# Patient Record
Sex: MALE | Race: OTHER | HISPANIC OR LATINO | URBAN - METROPOLITAN AREA
[De-identification: names, ages, dates, MRNs, and addresses within clinical notes are randomized per-mention and may not be internally consistent; named-entity substitution may affect disease eponyms.]

---

## 2017-02-20 ENCOUNTER — EMERGENCY (EMERGENCY)
Facility: HOSPITAL | Age: 52
LOS: 1 days | Discharge: HOME | End: 2017-02-20
Attending: EMERGENCY MEDICINE
Payer: COMMERCIAL

## 2017-02-20 VITALS
HEIGHT: 71 IN | SYSTOLIC BLOOD PRESSURE: 124 MMHG | RESPIRATION RATE: 17 BRPM | DIASTOLIC BLOOD PRESSURE: 81 MMHG | WEIGHT: 179.9 LBS | HEART RATE: 82 BPM | TEMPERATURE: 98 F | OXYGEN SATURATION: 97 %

## 2017-02-20 DIAGNOSIS — I25.10 ATHEROSCLEROTIC HEART DISEASE OF NATIVE CORONARY ARTERY WITHOUT ANGINA PECTORIS: ICD-10-CM

## 2017-02-20 DIAGNOSIS — Z79.82 LONG TERM (CURRENT) USE OF ASPIRIN: ICD-10-CM

## 2017-02-20 DIAGNOSIS — L02.411 CUTANEOUS ABSCESS OF RIGHT AXILLA: ICD-10-CM

## 2017-02-20 DIAGNOSIS — E78.00 PURE HYPERCHOLESTEROLEMIA, UNSPECIFIED: ICD-10-CM

## 2017-02-20 DIAGNOSIS — M51.37 OTHER INTERVERTEBRAL DISC DEGENERATION, LUMBOSACRAL REGION: Chronic | ICD-10-CM

## 2017-02-20 DIAGNOSIS — Z98.890 OTHER SPECIFIED POSTPROCEDURAL STATES: ICD-10-CM

## 2017-02-20 PROCEDURE — 99282 EMERGENCY DEPT VISIT SF MDM: CPT | Mod: 25

## 2017-02-20 PROCEDURE — 10060 I&D ABSCESS SIMPLE/SINGLE: CPT

## 2017-02-20 PROCEDURE — 10060 I&D ABSCESS SIMPLE/SINGLE: CPT | Mod: RT

## 2017-02-20 PROCEDURE — 99283 EMERGENCY DEPT VISIT LOW MDM: CPT | Mod: 25

## 2017-02-20 RX ORDER — AZTREONAM 2 G
1 VIAL (EA) INJECTION
Qty: 14 | Refills: 0 | OUTPATIENT
Start: 2017-02-20 | End: 2017-02-27

## 2017-02-20 NOTE — ED PROVIDER NOTE - PHYSICAL EXAMINATION
right axilla: swelling, erythema, fluctuance 2x2cm consistent with abscess, with mild surrounding erythema.

## 2017-02-20 NOTE — ED PROVIDER NOTE - OBJECTIVE STATEMENT
50yo male no sig pmh p/w swelling under his right armpit for 3 days, its getting worse, it hurts if he touches it, nothing makes it better. Denies fevers, chills, or anything else bothering him.

## 2017-02-20 NOTE — ED PROVIDER NOTE - PMH
Anxiety    CAD (coronary artery disease)    Herniated Disc    Herniated lumbar intervertebral disc    Hypercholesteremia    Migraine  2X weekly

## 2018-08-16 ENCOUNTER — APPOINTMENT (OUTPATIENT)
Dept: SPINE | Facility: CLINIC | Age: 53
End: 2018-08-16
Payer: COMMERCIAL

## 2018-08-16 VITALS
DIASTOLIC BLOOD PRESSURE: 80 MMHG | WEIGHT: 180 LBS | SYSTOLIC BLOOD PRESSURE: 120 MMHG | HEIGHT: 71 IN | BODY MASS INDEX: 25.2 KG/M2

## 2018-08-16 DIAGNOSIS — Z78.9 OTHER SPECIFIED HEALTH STATUS: ICD-10-CM

## 2018-08-16 PROCEDURE — 99203 OFFICE O/P NEW LOW 30 MIN: CPT

## 2018-08-16 RX ORDER — ALPRAZOLAM 0.25 MG/1
0.25 TABLET ORAL
Refills: 0 | Status: ACTIVE | COMMUNITY

## 2018-08-16 RX ORDER — BACLOFEN 20 MG/1
20 TABLET ORAL
Refills: 0 | Status: ACTIVE | COMMUNITY

## 2018-08-16 RX ORDER — IBUPROFEN 800 MG/1
800 TABLET, FILM COATED ORAL
Refills: 0 | Status: ACTIVE | COMMUNITY

## 2018-08-16 RX ORDER — PANTOPRAZOLE 40 MG/1
40 TABLET, DELAYED RELEASE ORAL
Refills: 0 | Status: ACTIVE | COMMUNITY

## 2018-10-04 ENCOUNTER — APPOINTMENT (OUTPATIENT)
Dept: SPINE | Facility: CLINIC | Age: 53
End: 2018-10-04
Payer: COMMERCIAL

## 2018-10-04 ENCOUNTER — TRANSCRIPTION ENCOUNTER (OUTPATIENT)
Age: 53
End: 2018-10-04

## 2018-10-04 PROCEDURE — 99213 OFFICE O/P EST LOW 20 MIN: CPT

## 2019-05-22 ENCOUNTER — EMERGENCY (EMERGENCY)
Facility: HOSPITAL | Age: 54
LOS: 1 days | Discharge: HOME | End: 2019-05-22
Attending: EMERGENCY MEDICINE
Payer: MEDICARE

## 2019-05-22 VITALS
WEIGHT: 179.9 LBS | OXYGEN SATURATION: 98 % | TEMPERATURE: 98 F | HEART RATE: 76 BPM | SYSTOLIC BLOOD PRESSURE: 120 MMHG | RESPIRATION RATE: 18 BRPM | DIASTOLIC BLOOD PRESSURE: 75 MMHG

## 2019-05-22 DIAGNOSIS — M51.37 OTHER INTERVERTEBRAL DISC DEGENERATION, LUMBOSACRAL REGION: Chronic | ICD-10-CM

## 2019-05-22 LAB
ALBUMIN SERPL ELPH-MCNC: 4.2 G/DL — SIGNIFICANT CHANGE UP (ref 3.5–5)
ALP SERPL-CCNC: 59 U/L — SIGNIFICANT CHANGE UP (ref 40–120)
ALT FLD-CCNC: 30 U/L DA — SIGNIFICANT CHANGE UP (ref 10–60)
ANION GAP SERPL CALC-SCNC: 5 MMOL/L — SIGNIFICANT CHANGE UP (ref 5–17)
APTT BLD: 32.8 SEC — SIGNIFICANT CHANGE UP (ref 27.5–36.3)
AST SERPL-CCNC: 23 U/L — SIGNIFICANT CHANGE UP (ref 10–40)
BILIRUB SERPL-MCNC: 0.5 MG/DL — SIGNIFICANT CHANGE UP (ref 0.2–1.2)
BUN SERPL-MCNC: 20 MG/DL — HIGH (ref 7–18)
CALCIUM SERPL-MCNC: 9 MG/DL — SIGNIFICANT CHANGE UP (ref 8.4–10.5)
CHLORIDE SERPL-SCNC: 108 MMOL/L — SIGNIFICANT CHANGE UP (ref 96–108)
CO2 SERPL-SCNC: 28 MMOL/L — SIGNIFICANT CHANGE UP (ref 22–31)
CREAT SERPL-MCNC: 1.1 MG/DL — SIGNIFICANT CHANGE UP (ref 0.5–1.3)
GLUCOSE SERPL-MCNC: 93 MG/DL — SIGNIFICANT CHANGE UP (ref 70–99)
HCT VFR BLD CALC: 43.1 % — SIGNIFICANT CHANGE UP (ref 39–50)
HGB BLD-MCNC: 14.2 G/DL — SIGNIFICANT CHANGE UP (ref 13–17)
INR BLD: 0.96 RATIO — SIGNIFICANT CHANGE UP (ref 0.88–1.16)
MCHC RBC-ENTMCNC: 32.6 PG — SIGNIFICANT CHANGE UP (ref 27–34)
MCHC RBC-ENTMCNC: 32.9 GM/DL — SIGNIFICANT CHANGE UP (ref 32–36)
MCV RBC AUTO: 99.1 FL — SIGNIFICANT CHANGE UP (ref 80–100)
NRBC # BLD: 0 /100 WBCS — SIGNIFICANT CHANGE UP (ref 0–0)
PLATELET # BLD AUTO: 209 K/UL — SIGNIFICANT CHANGE UP (ref 150–400)
POTASSIUM SERPL-MCNC: 4.4 MMOL/L — SIGNIFICANT CHANGE UP (ref 3.5–5.3)
POTASSIUM SERPL-SCNC: 4.4 MMOL/L — SIGNIFICANT CHANGE UP (ref 3.5–5.3)
PROT SERPL-MCNC: 8.2 G/DL — SIGNIFICANT CHANGE UP (ref 6–8.3)
PROTHROM AB SERPL-ACNC: 10.6 SEC — SIGNIFICANT CHANGE UP (ref 10–12.9)
RBC # BLD: 4.35 M/UL — SIGNIFICANT CHANGE UP (ref 4.2–5.8)
RBC # FLD: 12.4 % — SIGNIFICANT CHANGE UP (ref 10.3–14.5)
SODIUM SERPL-SCNC: 141 MMOL/L — SIGNIFICANT CHANGE UP (ref 135–145)
TROPONIN I SERPL-MCNC: <0.015 NG/ML — SIGNIFICANT CHANGE UP (ref 0–0.04)
WBC # BLD: 5.22 K/UL — SIGNIFICANT CHANGE UP (ref 3.8–10.5)
WBC # FLD AUTO: 5.22 K/UL — SIGNIFICANT CHANGE UP (ref 3.8–10.5)

## 2019-05-22 PROCEDURE — 71046 X-RAY EXAM CHEST 2 VIEWS: CPT

## 2019-05-22 PROCEDURE — 80053 COMPREHEN METABOLIC PANEL: CPT

## 2019-05-22 PROCEDURE — 36415 COLL VENOUS BLD VENIPUNCTURE: CPT

## 2019-05-22 PROCEDURE — 84484 ASSAY OF TROPONIN QUANT: CPT

## 2019-05-22 PROCEDURE — 85730 THROMBOPLASTIN TIME PARTIAL: CPT

## 2019-05-22 PROCEDURE — 93005 ELECTROCARDIOGRAM TRACING: CPT

## 2019-05-22 PROCEDURE — 85610 PROTHROMBIN TIME: CPT

## 2019-05-22 PROCEDURE — 99285 EMERGENCY DEPT VISIT HI MDM: CPT

## 2019-05-22 PROCEDURE — 85027 COMPLETE CBC AUTOMATED: CPT

## 2019-05-22 PROCEDURE — 71046 X-RAY EXAM CHEST 2 VIEWS: CPT | Mod: 26

## 2019-05-22 PROCEDURE — 99283 EMERGENCY DEPT VISIT LOW MDM: CPT | Mod: 25

## 2019-05-22 RX ORDER — SODIUM CHLORIDE 9 MG/ML
1000 INJECTION INTRAMUSCULAR; INTRAVENOUS; SUBCUTANEOUS ONCE
Refills: 0 | Status: COMPLETED | OUTPATIENT
Start: 2019-05-22 | End: 2019-05-22

## 2019-05-22 RX ORDER — DIAZEPAM 5 MG
5 TABLET ORAL ONCE
Refills: 0 | Status: DISCONTINUED | OUTPATIENT
Start: 2019-05-22 | End: 2019-05-22

## 2019-05-22 RX ADMIN — SODIUM CHLORIDE 2000 MILLILITER(S): 9 INJECTION INTRAMUSCULAR; INTRAVENOUS; SUBCUTANEOUS at 16:31

## 2019-05-22 RX ADMIN — Medication 5 MILLIGRAM(S): at 16:50

## 2019-05-22 NOTE — ED PROVIDER NOTE - OBJECTIVE STATEMENT
52 yo hx of prior back surgery, anxiety w complaints of weakness, palpitations, shortness of breath for three days, no worsening of sx on exertion, feels anxious regarding sx. On Paroxetine, Xanax. 52 yo hx of prior back surgery, anxiety w complaints of weakness, palpitations, shortness of breath for three days, no worsening of sx on exertion, feels anxious regarding sx. On Paroxetine, Xanax.  Has chronic back pain and receive unspecified "shots in back" for back pain. No assoc f/c, IVDU, swelling. Pt has not tried anything for symptoms, no other aggravating or relieving factors.

## 2019-05-22 NOTE — ED PROVIDER NOTE - CLINICAL SUMMARY MEDICAL DECISION MAKING FREE TEXT BOX
Low risk wells, will eval for ACS, other lyte abnormality, appears some component of anxiety. Last cardiac evaluation four years ago. Low risk wells, will eval for ACS, other lyte abnormality, appears some component of anxiety. Last cardiac evaluation reviewed on prior admission. Low risk wells, will eval for ACS, other lyte abnormality. Last cardiac evaluation reviewed on prior admission.

## 2019-05-22 NOTE — ED PROVIDER NOTE - PROGRESS NOTE DETAILS
Resting comfortably, no cp, Discussed with pt results of work up, strict return precautions, and need for follow up.  Pt expressed understanding and agrees with plan. advised to fu cards this week. return for any worsening sx.

## 2019-05-22 NOTE — ED PROVIDER NOTE - NSFOLLOWUPINSTRUCTIONS_ED_ALL_ED_FT
Disnea en los adultos  Shortness of Breath, Adult  Introducción  Image   La disnea significa que tiene dificultad para respirar. Los pulmones son órganos involucrados en la respiración.    Siga estas indicaciones en meade casa:  Esté atento a cualquier cambio en los síntomas. [place] estas medidas para controlar meade afección:  No fume. Fumar puede causar disnea. Si necesita ayuda para dejar de fumar, consulte con el médico.  Evite todo aquello que puede dificultar la respiración; por ejemplo:  Moho.  Polvo.  Contaminación del aire.  Olores de productos químicos.  Cosas que causan síntomas de alergia (alérgenos), si tiene alergias.  Mantenga meade casa limpia, y sin moho ni polvo.  Descanse todo lo que sea necesario. Retome gradualmente yovany actividades habituales.  [place] los medicamentos de venta marina y los recetados, que incluyen el oxígeno y los medicamentos que se inhalan, solamente joe se lo haya indicado el médico.  Concurra a todas las visitas de control joe se lo haya indicado el médico. [place] es importante.  Comuníquese con un médico si:  Meade afección no se vandana tan pronto joe se espera.  Le renaldo hacer las actividades cotidianas, incluso después de descansar.  Aparecen nuevos síntomas.  Solicite ayuda de inmediato si:  Tiene dificultad para respirar cuando descansa.  Se siente mareado o se desmaya.  Tiene tos que no se vandana con medicamentos.  Tose y escupe sarah.  Siente dolor al respirar.  Tiene dolor en el pecho, los brazos, los hombros o el vientre (abdomen).  Tiene fiebre.  No puede subir escaleras.  No puede realizar ejercicio del modo en que lo hacía habitualmente.  Esta información no tiene joe fin reemplazar el consejo del médico. Asegúrese de hacerle al médico cualquier pregunta que tenga.

## 2019-06-20 ENCOUNTER — TRANSCRIPTION ENCOUNTER (OUTPATIENT)
Age: 54
End: 2019-06-20

## 2019-06-26 ENCOUNTER — APPOINTMENT (OUTPATIENT)
Dept: ORTHOPEDIC SURGERY | Facility: CLINIC | Age: 54
End: 2019-06-26
Payer: MEDICARE

## 2019-06-26 VITALS — BODY MASS INDEX: 25.2 KG/M2 | HEIGHT: 71 IN | WEIGHT: 180 LBS

## 2019-06-26 DIAGNOSIS — M25.562 PAIN IN RIGHT KNEE: ICD-10-CM

## 2019-06-26 DIAGNOSIS — M25.561 PAIN IN RIGHT KNEE: ICD-10-CM

## 2019-06-26 PROCEDURE — 73564 X-RAY EXAM KNEE 4 OR MORE: CPT | Mod: 50

## 2019-06-26 PROCEDURE — 99204 OFFICE O/P NEW MOD 45 MIN: CPT

## 2019-06-26 NOTE — PHYSICAL EXAM
[de-identified] : Long standing knee, AP knee, lateral knee, and patellar views of the bilateral knee were ordered and taken in the office and demonstrate no evidence of degenerative joint disease of the knee, fractures, intra-articular pathology. [de-identified] : Patient is well nourished, well-developed, in no acute distress, with appropriate mood and affect. The patient is oriented to time, place, and person. Respirations are even and unlabored. Gait evaluation does not reveal a limp. There is no inguinal adenopathy. The right limb is well-perfused and showed 2+ dp/pt pulses, without skin lesions, shows a grossly normal motor and sensory examination. Right knee motion is painless and the knee moves from 0-125 degrees. The knee is stable within that range-of-motion to AP and ML stress with a 1A Lachman, negative anterior or posterior drawer and no instability to varus or valgus stress. The alignment of the knee is 5 degrees varus. No effusion or crepitus is noted. No tenderness to palpation about the medial or lateral joint line, medial or lateral tibial plateau, medial or lateral femoral condyle, medial or lateral patellar facets, superior or inferior pole of the patella. Chandu's is negative. Muscle strength is normal. Pedal pulses are palpable. Hip examination was negative. The left limb is well-perfused and showed 2+ dp/pt pulses, without skin lesions, shows a grossly normal motor and sensory examination. Left knee motion is painless and the knee moves from 0-125 degrees. The knee is stable within that range-of-motion to AP and ML stress with a 1A Lachman, negative anterior or posterior drawer and no instability to varus or valgus stress. The alignment of the knee is 5 degrees varus. No effusion or crepitus is noted. No tenderness to palpation about the medial or lateral joint line, medial or lateral tibial plateau, medial or lateral femoral condyle, medial or lateral patellar facets, superior or inferior pole of the patella. Chandu's is negative. Muscle strength is normal. Pedal pulses are palpable. Hip examination was negative.

## 2019-06-26 NOTE — DISCUSSION/SUMMARY
[de-identified] : This patient has bilateral lower extremity pain secondary to lumbar radiculopathy.  His knees exams are benign and radiographs are normal.  Likely the pain is coming from his lumbar spine due to lumbar radiculopathy.  Referral made to physiatry.  Continue with the Mobic.  Follow-up PRN.

## 2019-06-26 NOTE — HISTORY OF PRESENT ILLNESS
[de-identified] : This is very nice 53-year-old gentleman experiencing 2 weeks of bilateral lower extremity pain, which is severe in intensity.  He has a history of lumbar radiculopathy secondary to spinal stenosis.  He has already had spine surgery in the past.  He complains of 2 weeks of medial thigh pain and leg pain that radiates to the foot that is associated with numbness and tingling but no weakness.  No bowel or bladder incontinence.  The pain substantially limits activities of daily living. Walking tolerance is reduced.  Mobic does help to improve the pain.  He has not had physical therapy.  He does not use a cane or walker.

## 2019-10-02 ENCOUNTER — EMERGENCY (EMERGENCY)
Facility: HOSPITAL | Age: 54
LOS: 1 days | Discharge: HOME | End: 2019-10-02
Attending: EMERGENCY MEDICINE
Payer: MEDICARE

## 2019-10-02 VITALS
HEIGHT: 71 IN | SYSTOLIC BLOOD PRESSURE: 120 MMHG | RESPIRATION RATE: 16 BRPM | OXYGEN SATURATION: 98 % | WEIGHT: 179.9 LBS | DIASTOLIC BLOOD PRESSURE: 84 MMHG | HEART RATE: 74 BPM | TEMPERATURE: 98 F

## 2019-10-02 DIAGNOSIS — M51.37 OTHER INTERVERTEBRAL DISC DEGENERATION, LUMBOSACRAL REGION: Chronic | ICD-10-CM

## 2019-10-02 LAB
ALBUMIN SERPL ELPH-MCNC: 4 G/DL — SIGNIFICANT CHANGE UP (ref 3.5–5)
ALP SERPL-CCNC: 65 U/L — SIGNIFICANT CHANGE UP (ref 40–120)
ALT FLD-CCNC: 31 U/L DA — SIGNIFICANT CHANGE UP (ref 10–60)
ANION GAP SERPL CALC-SCNC: 7 MMOL/L — SIGNIFICANT CHANGE UP (ref 5–17)
AST SERPL-CCNC: 21 U/L — SIGNIFICANT CHANGE UP (ref 10–40)
BASOPHILS # BLD AUTO: 0.01 K/UL — SIGNIFICANT CHANGE UP (ref 0–0.2)
BASOPHILS NFR BLD AUTO: 0.2 % — SIGNIFICANT CHANGE UP (ref 0–2)
BILIRUB SERPL-MCNC: 0.6 MG/DL — SIGNIFICANT CHANGE UP (ref 0.2–1.2)
BUN SERPL-MCNC: 14 MG/DL — SIGNIFICANT CHANGE UP (ref 7–18)
CALCIUM SERPL-MCNC: 9.2 MG/DL — SIGNIFICANT CHANGE UP (ref 8.4–10.5)
CHLORIDE SERPL-SCNC: 109 MMOL/L — HIGH (ref 96–108)
CO2 SERPL-SCNC: 25 MMOL/L — SIGNIFICANT CHANGE UP (ref 22–31)
CREAT SERPL-MCNC: 1.14 MG/DL — SIGNIFICANT CHANGE UP (ref 0.5–1.3)
EOSINOPHIL # BLD AUTO: 0.04 K/UL — SIGNIFICANT CHANGE UP (ref 0–0.5)
EOSINOPHIL NFR BLD AUTO: 0.9 % — SIGNIFICANT CHANGE UP (ref 0–6)
GLUCOSE SERPL-MCNC: 94 MG/DL — SIGNIFICANT CHANGE UP (ref 70–99)
HCT VFR BLD CALC: 42.1 % — SIGNIFICANT CHANGE UP (ref 39–50)
HGB BLD-MCNC: 14.3 G/DL — SIGNIFICANT CHANGE UP (ref 13–17)
IMM GRANULOCYTES NFR BLD AUTO: 0 % — SIGNIFICANT CHANGE UP (ref 0–1.5)
LIDOCAIN IGE QN: 89 U/L — SIGNIFICANT CHANGE UP (ref 73–393)
LYMPHOCYTES # BLD AUTO: 1.3 K/UL — SIGNIFICANT CHANGE UP (ref 1–3.3)
LYMPHOCYTES # BLD AUTO: 28.4 % — SIGNIFICANT CHANGE UP (ref 13–44)
MCHC RBC-ENTMCNC: 32.3 PG — SIGNIFICANT CHANGE UP (ref 27–34)
MCHC RBC-ENTMCNC: 34 GM/DL — SIGNIFICANT CHANGE UP (ref 32–36)
MCV RBC AUTO: 95 FL — SIGNIFICANT CHANGE UP (ref 80–100)
MONOCYTES # BLD AUTO: 0.34 K/UL — SIGNIFICANT CHANGE UP (ref 0–0.9)
MONOCYTES NFR BLD AUTO: 7.4 % — SIGNIFICANT CHANGE UP (ref 2–14)
NEUTROPHILS # BLD AUTO: 2.88 K/UL — SIGNIFICANT CHANGE UP (ref 1.8–7.4)
NEUTROPHILS NFR BLD AUTO: 63.1 % — SIGNIFICANT CHANGE UP (ref 43–77)
NRBC # BLD: 0 /100 WBCS — SIGNIFICANT CHANGE UP (ref 0–0)
OB PNL STL: NEGATIVE — SIGNIFICANT CHANGE UP
PLATELET # BLD AUTO: 209 K/UL — SIGNIFICANT CHANGE UP (ref 150–400)
POTASSIUM SERPL-MCNC: 4 MMOL/L — SIGNIFICANT CHANGE UP (ref 3.5–5.3)
POTASSIUM SERPL-SCNC: 4 MMOL/L — SIGNIFICANT CHANGE UP (ref 3.5–5.3)
PROT SERPL-MCNC: 8.1 G/DL — SIGNIFICANT CHANGE UP (ref 6–8.3)
RBC # BLD: 4.43 M/UL — SIGNIFICANT CHANGE UP (ref 4.2–5.8)
RBC # FLD: 11.9 % — SIGNIFICANT CHANGE UP (ref 10.3–14.5)
SODIUM SERPL-SCNC: 141 MMOL/L — SIGNIFICANT CHANGE UP (ref 135–145)
WBC # BLD: 4.57 K/UL — SIGNIFICANT CHANGE UP (ref 3.8–10.5)
WBC # FLD AUTO: 4.57 K/UL — SIGNIFICANT CHANGE UP (ref 3.8–10.5)

## 2019-10-02 PROCEDURE — 96374 THER/PROPH/DIAG INJ IV PUSH: CPT | Mod: XU

## 2019-10-02 PROCEDURE — 36415 COLL VENOUS BLD VENIPUNCTURE: CPT

## 2019-10-02 PROCEDURE — 99284 EMERGENCY DEPT VISIT MOD MDM: CPT | Mod: 25

## 2019-10-02 PROCEDURE — 74177 CT ABD & PELVIS W/CONTRAST: CPT | Mod: 26

## 2019-10-02 PROCEDURE — 83690 ASSAY OF LIPASE: CPT

## 2019-10-02 PROCEDURE — 82272 OCCULT BLD FECES 1-3 TESTS: CPT

## 2019-10-02 PROCEDURE — 80053 COMPREHEN METABOLIC PANEL: CPT

## 2019-10-02 PROCEDURE — 85027 COMPLETE CBC AUTOMATED: CPT

## 2019-10-02 PROCEDURE — 99284 EMERGENCY DEPT VISIT MOD MDM: CPT

## 2019-10-02 PROCEDURE — 74177 CT ABD & PELVIS W/CONTRAST: CPT

## 2019-10-02 RX ORDER — PANTOPRAZOLE SODIUM 20 MG/1
40 TABLET, DELAYED RELEASE ORAL ONCE
Refills: 0 | Status: COMPLETED | OUTPATIENT
Start: 2019-10-02 | End: 2019-10-02

## 2019-10-02 RX ORDER — ONDANSETRON 8 MG/1
1 TABLET, FILM COATED ORAL
Qty: 21 | Refills: 0
Start: 2019-10-02

## 2019-10-02 RX ADMIN — PANTOPRAZOLE SODIUM 40 MILLIGRAM(S): 20 TABLET, DELAYED RELEASE ORAL at 15:15

## 2019-10-02 NOTE — ED PROVIDER NOTE - PATIENT PORTAL LINK FT
You can access the FollowMyHealth Patient Portal offered by [facility] by registering at the following website: [email or web address]. By joining [employer]’s FollowMyHealth portal, you will also be able to view your health information using other applications (apps) compatible with our system.

## 2019-10-02 NOTE — ED ADULT NURSE NOTE - NSFALLRSKASSESSTYPE_ED_ALL_ED
Initial (On Arrival) Affect and characteristics of appearance, verbalizations, behaviors are appropriate

## 2019-10-02 NOTE — ED ADULT NURSE NOTE - OBJECTIVE STATEMENT
Pt. c/o generalized abdominal pain that started 3 days ago with this morning having "a little diarrhea". Pt. c/o black stool.

## 2019-10-02 NOTE — ED PROVIDER NOTE - OBJECTIVE STATEMENT
54 y/o M with PMHx of CAD, HLD presents to the ED with complaints of abdominal pain and black stool x 3 days. Patient notes pain originally in the LLQ and is now diffuse. Patient has no prior history of GI bleed. Denies vomiting, diarrhea, constipation or any other acute complaints. Patient denies excessive NSAID use.

## 2020-06-05 ENCOUNTER — APPOINTMENT (OUTPATIENT)
Dept: UROLOGY | Facility: CLINIC | Age: 55
End: 2020-06-05
Payer: MEDICARE

## 2020-06-05 VITALS
HEIGHT: 71 IN | WEIGHT: 180 LBS | HEART RATE: 88 BPM | SYSTOLIC BLOOD PRESSURE: 122 MMHG | DIASTOLIC BLOOD PRESSURE: 82 MMHG | TEMPERATURE: 97.3 F | BODY MASS INDEX: 25.2 KG/M2

## 2020-06-05 PROCEDURE — 99204 OFFICE O/P NEW MOD 45 MIN: CPT

## 2020-06-05 RX ORDER — SILDENAFIL 100 MG/1
100 TABLET, FILM COATED ORAL
Qty: 10 | Refills: 1 | Status: ACTIVE | COMMUNITY
Start: 2020-06-05 | End: 1900-01-01

## 2020-06-05 NOTE — HISTORY OF PRESENT ILLNESS
[FreeTextEntry1] : cc ed \par 55 yo male long h/o ed low test\par pt of dr mccord was on test injection but not since last year due to insurance change\par poor erections some improvement with sildenafil . decreaed libido reports h/o infertility \par voidng well\par \par psa 0.7

## 2020-06-05 NOTE — ASSESSMENT
[FreeTextEntry1] : Will try sildenafil side effects reviewed\par More common reviewed- redness or warmth in your face, neck, or chest; cold symptoms such as stuffy nose, sneezing, or sore throat; headache; memory problems; diarrhea, upset stomach; or muscle pain, back pain.\par Stop immediately and got to ER for changes in vision or sudden vision loss; ringing in your ears, or sudden hearing loss; chest pain or heavy feeling, pain spreading to the arm or shoulder, nausea, sweating, general ill feeling; irregular heartbeat; shortness of breath, swelling in your hands or feet; seizure (convulsions); feeling light-headed, fainting; or penis erection that is painful or lasts 4 hours or longer\par \par check test level \par

## 2020-06-10 ENCOUNTER — TRANSCRIPTION ENCOUNTER (OUTPATIENT)
Age: 55
End: 2020-06-10

## 2020-06-10 LAB
LH SERPL-ACNC: 7.7 IU/L
PROLACTIN SERPL-MCNC: 9 NG/ML

## 2020-07-23 LAB
SHBG SERPL-SCNC: 35 NMOL/L
TESTOST BND SERPL-MCNC: 7.1 PG/ML
TESTOST SERPL-MCNC: 332.3 NG/DL

## 2020-09-02 ENCOUNTER — APPOINTMENT (OUTPATIENT)
Dept: UROLOGY | Facility: CLINIC | Age: 55
End: 2020-09-02
Payer: MEDICARE

## 2020-09-02 VITALS
HEART RATE: 89 BPM | SYSTOLIC BLOOD PRESSURE: 120 MMHG | HEIGHT: 71 IN | DIASTOLIC BLOOD PRESSURE: 82 MMHG | TEMPERATURE: 98.1 F | WEIGHT: 180 LBS | BODY MASS INDEX: 25.2 KG/M2

## 2020-09-02 DIAGNOSIS — N52.9 MALE ERECTILE DYSFUNCTION, UNSPECIFIED: ICD-10-CM

## 2020-09-02 PROCEDURE — 99213 OFFICE O/P EST LOW 20 MIN: CPT

## 2020-09-03 PROBLEM — N52.9 ERECTILE DYSFUNCTION: Status: ACTIVE | Noted: 2020-06-05

## 2020-09-03 RX ORDER — TESTOSTERONE CYPIONATE 200 MG/ML
200 INJECTION, SOLUTION INTRAMUSCULAR
Qty: 2 | Refills: 0 | Status: ACTIVE | COMMUNITY
Start: 2020-09-02 | End: 1900-01-01

## 2020-09-03 NOTE — ASSESSMENT
[FreeTextEntry1] : Pt would like to try testosterone replacement therapy\par Reviewed risk it may\par Contribute to sleep apnea — a potentially serious sleep disorder in which breathing repeatedly stops and starts\par Cause acne or other skin reactions\par Stimulate noncancerous growth of the prostate (benign prostatic hyperplasia) and growth of existing prostate cancer\par Enlarge breasts Limit sperm production or cause testicle shrinkage\par Increase the risk of a blood clot forming in a deep vein (deep vein thrombosis), which could break loose, travel through your bloodstream and lodge in your lungs, blocking blood flow (pulmonary embolism)\par Reviewed unknown risk for prostate cancer and cardiovascular disease\par will f/u for test injection - unwilling to inject imself\par

## 2020-09-08 LAB
TESTOST BND SERPL-MCNC: 8.5 PG/ML
TESTOST SERPL-MCNC: 358.2 NG/DL

## 2021-02-26 ENCOUNTER — EMERGENCY (EMERGENCY)
Facility: HOSPITAL | Age: 56
LOS: 1 days | Discharge: HOME | End: 2021-02-26
Attending: EMERGENCY MEDICINE | Admitting: EMERGENCY MEDICINE
Payer: MEDICARE

## 2021-02-26 VITALS
HEIGHT: 71 IN | HEART RATE: 77 BPM | TEMPERATURE: 97 F | OXYGEN SATURATION: 99 % | DIASTOLIC BLOOD PRESSURE: 63 MMHG | RESPIRATION RATE: 16 BRPM | SYSTOLIC BLOOD PRESSURE: 119 MMHG

## 2021-02-26 VITALS
TEMPERATURE: 98 F | HEART RATE: 64 BPM | RESPIRATION RATE: 18 BRPM | OXYGEN SATURATION: 100 % | SYSTOLIC BLOOD PRESSURE: 121 MMHG | DIASTOLIC BLOOD PRESSURE: 78 MMHG

## 2021-02-26 DIAGNOSIS — M51.37 OTHER INTERVERTEBRAL DISC DEGENERATION, LUMBOSACRAL REGION: Chronic | ICD-10-CM

## 2021-02-26 LAB
ALBUMIN SERPL ELPH-MCNC: 4.7 G/DL — SIGNIFICANT CHANGE UP (ref 3.3–5)
ALP SERPL-CCNC: 62 U/L — SIGNIFICANT CHANGE UP (ref 40–120)
ALT FLD-CCNC: 18 U/L — SIGNIFICANT CHANGE UP (ref 4–41)
ANION GAP SERPL CALC-SCNC: 12 MMOL/L — SIGNIFICANT CHANGE UP (ref 7–14)
APPEARANCE UR: CLEAR — SIGNIFICANT CHANGE UP
AST SERPL-CCNC: 21 U/L — SIGNIFICANT CHANGE UP (ref 4–40)
BASOPHILS # BLD AUTO: 0.02 K/UL — SIGNIFICANT CHANGE UP (ref 0–0.2)
BASOPHILS NFR BLD AUTO: 0.2 % — SIGNIFICANT CHANGE UP (ref 0–2)
BILIRUB SERPL-MCNC: 0.4 MG/DL — SIGNIFICANT CHANGE UP (ref 0.2–1.2)
BILIRUB UR-MCNC: NEGATIVE — SIGNIFICANT CHANGE UP
BUN SERPL-MCNC: 17 MG/DL — SIGNIFICANT CHANGE UP (ref 7–23)
CALCIUM SERPL-MCNC: 9.4 MG/DL — SIGNIFICANT CHANGE UP (ref 8.4–10.5)
CHLORIDE SERPL-SCNC: 102 MMOL/L — SIGNIFICANT CHANGE UP (ref 98–107)
CO2 SERPL-SCNC: 25 MMOL/L — SIGNIFICANT CHANGE UP (ref 22–31)
COLOR SPEC: YELLOW — SIGNIFICANT CHANGE UP
CREAT SERPL-MCNC: 0.98 MG/DL — SIGNIFICANT CHANGE UP (ref 0.5–1.3)
DIFF PNL FLD: NEGATIVE — SIGNIFICANT CHANGE UP
EOSINOPHIL # BLD AUTO: 0 K/UL — SIGNIFICANT CHANGE UP (ref 0–0.5)
EOSINOPHIL NFR BLD AUTO: 0 % — SIGNIFICANT CHANGE UP (ref 0–6)
GLUCOSE SERPL-MCNC: 98 MG/DL — SIGNIFICANT CHANGE UP (ref 70–99)
GLUCOSE UR QL: NEGATIVE — SIGNIFICANT CHANGE UP
HCT VFR BLD CALC: 44.4 % — SIGNIFICANT CHANGE UP (ref 39–50)
HGB BLD-MCNC: 14.5 G/DL — SIGNIFICANT CHANGE UP (ref 13–17)
IANC: 8.88 K/UL — HIGH (ref 1.5–8.5)
IMM GRANULOCYTES NFR BLD AUTO: 0.4 % — SIGNIFICANT CHANGE UP (ref 0–1.5)
KETONES UR-MCNC: ABNORMAL
LEUKOCYTE ESTERASE UR-ACNC: NEGATIVE — SIGNIFICANT CHANGE UP
LYMPHOCYTES # BLD AUTO: 1.64 K/UL — SIGNIFICANT CHANGE UP (ref 1–3.3)
LYMPHOCYTES # BLD AUTO: 14.7 % — SIGNIFICANT CHANGE UP (ref 13–44)
MCHC RBC-ENTMCNC: 31.5 PG — SIGNIFICANT CHANGE UP (ref 27–34)
MCHC RBC-ENTMCNC: 32.7 GM/DL — SIGNIFICANT CHANGE UP (ref 32–36)
MCV RBC AUTO: 96.5 FL — SIGNIFICANT CHANGE UP (ref 80–100)
MONOCYTES # BLD AUTO: 0.59 K/UL — SIGNIFICANT CHANGE UP (ref 0–0.9)
MONOCYTES NFR BLD AUTO: 5.3 % — SIGNIFICANT CHANGE UP (ref 2–14)
NEUTROPHILS # BLD AUTO: 8.88 K/UL — HIGH (ref 1.8–7.4)
NEUTROPHILS NFR BLD AUTO: 79.4 % — HIGH (ref 43–77)
NITRITE UR-MCNC: NEGATIVE — SIGNIFICANT CHANGE UP
NRBC # BLD: 0 /100 WBCS — SIGNIFICANT CHANGE UP
NRBC # FLD: 0 K/UL — SIGNIFICANT CHANGE UP
PH UR: 7.5 — SIGNIFICANT CHANGE UP (ref 5–8)
PLATELET # BLD AUTO: 262 K/UL — SIGNIFICANT CHANGE UP (ref 150–400)
POTASSIUM SERPL-MCNC: 3.8 MMOL/L — SIGNIFICANT CHANGE UP (ref 3.5–5.3)
POTASSIUM SERPL-SCNC: 3.8 MMOL/L — SIGNIFICANT CHANGE UP (ref 3.5–5.3)
PROT SERPL-MCNC: 7.5 G/DL — SIGNIFICANT CHANGE UP (ref 6–8.3)
PROT UR-MCNC: ABNORMAL
RBC # BLD: 4.6 M/UL — SIGNIFICANT CHANGE UP (ref 4.2–5.8)
RBC # FLD: 12 % — SIGNIFICANT CHANGE UP (ref 10.3–14.5)
SARS-COV-2 RNA SPEC QL NAA+PROBE: SIGNIFICANT CHANGE UP
SODIUM SERPL-SCNC: 139 MMOL/L — SIGNIFICANT CHANGE UP (ref 135–145)
SP GR SPEC: 1.03 — HIGH (ref 1.01–1.02)
TROPONIN T, HIGH SENSITIVITY RESULT: 12 NG/L — SIGNIFICANT CHANGE UP
TROPONIN T, HIGH SENSITIVITY RESULT: 15 NG/L — SIGNIFICANT CHANGE UP
TSH SERPL-MCNC: 0.76 UIU/ML — SIGNIFICANT CHANGE UP (ref 0.27–4.2)
UROBILINOGEN FLD QL: ABNORMAL
WBC # BLD: 11.17 K/UL — HIGH (ref 3.8–10.5)
WBC # FLD AUTO: 11.17 K/UL — HIGH (ref 3.8–10.5)

## 2021-02-26 PROCEDURE — 93010 ELECTROCARDIOGRAM REPORT: CPT

## 2021-02-26 PROCEDURE — 71046 X-RAY EXAM CHEST 2 VIEWS: CPT | Mod: 26

## 2021-02-26 PROCEDURE — 99285 EMERGENCY DEPT VISIT HI MDM: CPT | Mod: 25

## 2021-02-26 RX ORDER — SODIUM CHLORIDE 9 MG/ML
1000 INJECTION INTRAMUSCULAR; INTRAVENOUS; SUBCUTANEOUS ONCE
Refills: 0 | Status: COMPLETED | OUTPATIENT
Start: 2021-02-26 | End: 2021-02-26

## 2021-02-26 RX ADMIN — SODIUM CHLORIDE 1000 MILLILITER(S): 9 INJECTION INTRAMUSCULAR; INTRAVENOUS; SUBCUTANEOUS at 18:15

## 2021-02-26 NOTE — ED ADULT NURSE NOTE - CHIEF COMPLAINT QUOTE
C/o palpitations, generalized weakness, nausea and dizziness x 1 week. Pt states these symptoms "feel a little bit like anxiety attack". Pt took ASA@1330 today, states does not have allergy to ASA.  Hx CAD, anxiety. FS=98

## 2021-02-26 NOTE — ED PROVIDER NOTE - OBJECTIVE STATEMENT
56 y/o M, PMH of Migraines, GERD, and Panic Attacks, presents to ED c/o palpitations, dizziness/lightheadedness, and generalized weakness x 1 week. Pt reports that his symptoms acutely worsened over the last couple days, and he felt like he was going to pass out. Pt denies actual LOC. Pt denies CP, SOB, HA, abd pain, n/v/d, urinary sx, weakness/numbness, blood in stool, or any other symptoms at this time.

## 2021-02-26 NOTE — ED PROVIDER NOTE - EKG ADDITIONAL INFORMATION FREE TEXT
ecg, sr rate 67 no ischemic changes, no st twave elevations or changes, no pr shorting, no qt prolongation, nothing consisted with brugada

## 2021-02-26 NOTE — ED PROVIDER NOTE - CLINICAL SUMMARY MEDICAL DECISION MAKING FREE TEXT BOX
56 y/o M, PMH of Migraines, GERD, and Panic Attacks, presents to ED for pre-syncope. C/o fatigue, dizziness/lightheadedness, and palpitations. Pt states he is not sure If it is his heart of due to his panic attacks.  VSS. Physical exam unremarkable. EKG is NSR w/o ischemic changes, pr shorting, qt prolongation, or brugada.  Will do syncope work up: labs, trop, CXR, tsh, covid. IVF for symptomatic relief. Reassess. 56 y/o M, PMH of Migraines, GERD, and Panic Attacks, presents to ED for pre-syncope. C/o fatigue, dizziness/lightheadedness, and palpitations. Pt states he is not sure If it is his heart of due to his panic attacks.  VSS. Physical exam unremarkable. EKG is NSR w/o ischemic changes, pr shorting, qt prolongation, or brugada.  Will do syncope work up: labs, trop, CXR, tsh, covid. IVF for symptomatic relief. Reassess.    haughey: pt with known panic disorder feeling "unwell and weak" for the last 4-5 days.  ecg normal as stated above.  pt denies cough and fever, but feeling all over just weak.  neuro exam reveals normal strength in both upper and lower extremities, abd soft cor rrr pos s1s2, lungs clear.  will do basic lab tests, including a covid and chest film for wide differential.  tsh.  pt unlikely to be covid, 54 y/o M, PMH of Migraines, GERD, and Panic Attacks, presents to ED for pre-syncope. C/o fatigue, dizziness/lightheadedness, and palpitations. Pt states he is not sure If it is his heart of due to his panic attacks.  VSS. Physical exam unremarkable. EKG is NSR w/o ischemic changes, pr shorting, qt prolongation, or brugada.  Will do syncope work up: labs, trop, CXR, tsh, covid. IVF for symptomatic relief. Reassess.    haughey: pt with known panic disorder feeling "unwell and weak" for the last 4-5 days.  ecg normal as stated above.  pt denies cough and fever, but feeling all over just weak.  neuro exam reveals normal strength in both upper and lower extremities, abd soft cor rrr pos s1s2, lungs clear.  will do basic lab tests, including a covid and chest film for wide differential.  tsh.  pt unlikely to be covid,    pt here with nothing revealing of acute medical issues on evaluation including labs and ecg.  repeat trop wwas ok, covid still pending and pt to get result as outpt.    discussed anxiety disoder and connection to suicidality.  pt to f/u with pmd and crisis center

## 2021-02-26 NOTE — ED ADULT TRIAGE NOTE - CHIEF COMPLAINT QUOTE
C/o palpitations, generalized weakness, nausea and dizziness x 1 week. Pt states these symptoms "feel a little bit like anxiety attack". Pt took ASA at 1330 today. Hx CAD, anxiety. FS=98 C/o palpitations, generalized weakness, nausea and dizziness x 1 week. Pt states these symptoms "feel a little bit like anxiety attack". Pt took ASA@1330 today, states does not have allergy to ASA.  Hx CAD, anxiety. FS=98

## 2021-02-26 NOTE — ED PROVIDER NOTE - PATIENT PORTAL LINK FT
You can access the FollowMyHealth Patient Portal offered by [facility] by registering at the following website: [email or web address]. By joining [employer]’s FollowMyHealth portal, you will also be able to view your health information using other applications (apps) compatible with our system.

## 2021-02-26 NOTE — ED PROVIDER NOTE - NSFOLLOWUPCLINICS_GEN_ALL_ED_FT
[facility]  Psychiatry  [address] [place] [address]  Phone: [phone]  Fax:   Follow Up Time:

## 2021-02-26 NOTE — ED PROVIDER NOTE - NSFOLLOWUPINSTRUCTIONS_ED_ALL_ED_FT
you also have issues with anxiety  anxiety is a very real problem that requires care.  you have been given the sheet for the crisis center to follow up for a referral and care  anxiety is a separately treated issue and deserves your attention, as it can severely affect peoples lives  please follow up with the crisis center.  anxiety can be treated with talk therapy or with medication.  the crisis center will help you figure out what is the better plan.

## 2021-02-26 NOTE — ED PROVIDER NOTE - ATTENDING CONTRIBUTION TO CARE
pretty: pt with known panic disorder feeling "unwell and weak" for the last 4-5 days.  ecg normal as stated above.  pt denies cough and fever, but feeling all over just weak.  neuro exam reveals normal strength in both upper and lower extremities, abd soft cor rrr pos s1s2, lungs clear.  will do basic lab tests, including a covid and chest film for wide differential.  tsh.  pt unlikely to be covid,     I performed a history and physical exam of the patient and discussed their management with the resident and /or advanced care provider. I reviewed the resident and /or ACP's note and agree with the documented findings and plan of care. My medical decison making and observations are found above.

## 2021-02-28 LAB
CULTURE RESULTS: NO GROWTH — SIGNIFICANT CHANGE UP
SPECIMEN SOURCE: SIGNIFICANT CHANGE UP

## 2021-03-09 ENCOUNTER — EMERGENCY (EMERGENCY)
Facility: HOSPITAL | Age: 56
LOS: 1 days | Discharge: HOME | End: 2021-03-09
Attending: EMERGENCY MEDICINE | Admitting: EMERGENCY MEDICINE
Payer: MEDICARE

## 2021-03-09 VITALS
HEART RATE: 70 BPM | HEIGHT: 71 IN | RESPIRATION RATE: 18 BRPM | DIASTOLIC BLOOD PRESSURE: 73 MMHG | SYSTOLIC BLOOD PRESSURE: 116 MMHG | OXYGEN SATURATION: 100 % | TEMPERATURE: 98 F

## 2021-03-09 DIAGNOSIS — M51.37 OTHER INTERVERTEBRAL DISC DEGENERATION, LUMBOSACRAL REGION: Chronic | ICD-10-CM

## 2021-03-09 LAB
ALBUMIN SERPL ELPH-MCNC: 4.3 G/DL — SIGNIFICANT CHANGE UP (ref 3.3–5)
ALP SERPL-CCNC: 63 U/L — SIGNIFICANT CHANGE UP (ref 40–120)
ALT FLD-CCNC: 17 U/L — SIGNIFICANT CHANGE UP (ref 4–41)
ANION GAP SERPL CALC-SCNC: 9 MMOL/L — SIGNIFICANT CHANGE UP (ref 7–14)
AST SERPL-CCNC: 15 U/L — SIGNIFICANT CHANGE UP (ref 4–40)
BASOPHILS # BLD AUTO: 0.01 K/UL — SIGNIFICANT CHANGE UP (ref 0–0.2)
BASOPHILS NFR BLD AUTO: 0.2 % — SIGNIFICANT CHANGE UP (ref 0–2)
BILIRUB SERPL-MCNC: 0.5 MG/DL — SIGNIFICANT CHANGE UP (ref 0.2–1.2)
BUN SERPL-MCNC: 20 MG/DL — SIGNIFICANT CHANGE UP (ref 7–23)
CALCIUM SERPL-MCNC: 9.5 MG/DL — SIGNIFICANT CHANGE UP (ref 8.4–10.5)
CHLORIDE SERPL-SCNC: 104 MMOL/L — SIGNIFICANT CHANGE UP (ref 98–107)
CO2 SERPL-SCNC: 26 MMOL/L — SIGNIFICANT CHANGE UP (ref 22–31)
CREAT SERPL-MCNC: 0.96 MG/DL — SIGNIFICANT CHANGE UP (ref 0.5–1.3)
EOSINOPHIL # BLD AUTO: 0.05 K/UL — SIGNIFICANT CHANGE UP (ref 0–0.5)
EOSINOPHIL NFR BLD AUTO: 0.9 % — SIGNIFICANT CHANGE UP (ref 0–6)
GLUCOSE SERPL-MCNC: 86 MG/DL — SIGNIFICANT CHANGE UP (ref 70–99)
HCT VFR BLD CALC: 44.1 % — SIGNIFICANT CHANGE UP (ref 39–50)
HGB BLD-MCNC: 14.4 G/DL — SIGNIFICANT CHANGE UP (ref 13–17)
IANC: 3.48 K/UL — SIGNIFICANT CHANGE UP (ref 1.5–8.5)
IMM GRANULOCYTES NFR BLD AUTO: 0.2 % — SIGNIFICANT CHANGE UP (ref 0–1.5)
LIDOCAIN IGE QN: 24 U/L — SIGNIFICANT CHANGE UP (ref 7–60)
LYMPHOCYTES # BLD AUTO: 1.54 K/UL — SIGNIFICANT CHANGE UP (ref 1–3.3)
LYMPHOCYTES # BLD AUTO: 27.6 % — SIGNIFICANT CHANGE UP (ref 13–44)
MCHC RBC-ENTMCNC: 31.1 PG — SIGNIFICANT CHANGE UP (ref 27–34)
MCHC RBC-ENTMCNC: 32.7 GM/DL — SIGNIFICANT CHANGE UP (ref 32–36)
MCV RBC AUTO: 95.2 FL — SIGNIFICANT CHANGE UP (ref 80–100)
MONOCYTES # BLD AUTO: 0.48 K/UL — SIGNIFICANT CHANGE UP (ref 0–0.9)
MONOCYTES NFR BLD AUTO: 8.6 % — SIGNIFICANT CHANGE UP (ref 2–14)
NEUTROPHILS # BLD AUTO: 3.48 K/UL — SIGNIFICANT CHANGE UP (ref 1.8–7.4)
NEUTROPHILS NFR BLD AUTO: 62.5 % — SIGNIFICANT CHANGE UP (ref 43–77)
NRBC # BLD: 0 /100 WBCS — SIGNIFICANT CHANGE UP
NRBC # FLD: 0 K/UL — SIGNIFICANT CHANGE UP
PLATELET # BLD AUTO: 218 K/UL — SIGNIFICANT CHANGE UP (ref 150–400)
POTASSIUM SERPL-MCNC: 4.3 MMOL/L — SIGNIFICANT CHANGE UP (ref 3.5–5.3)
POTASSIUM SERPL-SCNC: 4.3 MMOL/L — SIGNIFICANT CHANGE UP (ref 3.5–5.3)
PROT SERPL-MCNC: 7.3 G/DL — SIGNIFICANT CHANGE UP (ref 6–8.3)
RBC # BLD: 4.63 M/UL — SIGNIFICANT CHANGE UP (ref 4.2–5.8)
RBC # FLD: 11.9 % — SIGNIFICANT CHANGE UP (ref 10.3–14.5)
SARS-COV-2 RNA SPEC QL NAA+PROBE: SIGNIFICANT CHANGE UP
SODIUM SERPL-SCNC: 139 MMOL/L — SIGNIFICANT CHANGE UP (ref 135–145)
TROPONIN T, HIGH SENSITIVITY RESULT: 7 NG/L — SIGNIFICANT CHANGE UP
TROPONIN T, HIGH SENSITIVITY RESULT: 8 NG/L — SIGNIFICANT CHANGE UP
WBC # BLD: 5.57 K/UL — SIGNIFICANT CHANGE UP (ref 3.8–10.5)
WBC # FLD AUTO: 5.57 K/UL — SIGNIFICANT CHANGE UP (ref 3.8–10.5)

## 2021-03-09 PROCEDURE — 99220: CPT | Mod: 25

## 2021-03-09 PROCEDURE — 93010 ELECTROCARDIOGRAM REPORT: CPT

## 2021-03-09 PROCEDURE — 71046 X-RAY EXAM CHEST 2 VIEWS: CPT | Mod: 26

## 2021-03-09 RX ORDER — SUCRALFATE 1 G
1 TABLET ORAL ONCE
Refills: 0 | Status: COMPLETED | OUTPATIENT
Start: 2021-03-09 | End: 2021-03-09

## 2021-03-09 RX ORDER — ALPRAZOLAM 0.25 MG
0.5 TABLET ORAL ONCE
Refills: 0 | Status: DISCONTINUED | OUTPATIENT
Start: 2021-03-09 | End: 2021-03-09

## 2021-03-09 RX ORDER — SERTRALINE 25 MG/1
100 TABLET, FILM COATED ORAL ONCE
Refills: 0 | Status: COMPLETED | OUTPATIENT
Start: 2021-03-10 | End: 2021-03-09

## 2021-03-09 RX ORDER — ALPRAZOLAM 0.25 MG
0.25 TABLET ORAL DAILY
Refills: 0 | Status: COMPLETED | OUTPATIENT
Start: 2021-03-10 | End: 2021-03-17

## 2021-03-09 RX ORDER — TAMSULOSIN HYDROCHLORIDE 0.4 MG/1
0.4 CAPSULE ORAL AT BEDTIME
Refills: 0 | Status: DISCONTINUED | OUTPATIENT
Start: 2021-03-09 | End: 2021-03-12

## 2021-03-09 RX ORDER — FAMOTIDINE 10 MG/ML
20 INJECTION INTRAVENOUS ONCE
Refills: 0 | Status: COMPLETED | OUTPATIENT
Start: 2021-03-09 | End: 2021-03-09

## 2021-03-09 RX ADMIN — Medication 30 MILLILITER(S): at 12:11

## 2021-03-09 RX ADMIN — SERTRALINE 100 MILLIGRAM(S): 25 TABLET, FILM COATED ORAL at 22:44

## 2021-03-09 RX ADMIN — Medication 1 GRAM(S): at 12:11

## 2021-03-09 RX ADMIN — FAMOTIDINE 20 MILLIGRAM(S): 10 INJECTION INTRAVENOUS at 12:11

## 2021-03-09 RX ADMIN — TAMSULOSIN HYDROCHLORIDE 0.4 MILLIGRAM(S): 0.4 CAPSULE ORAL at 22:38

## 2021-03-09 NOTE — ED ADULT TRIAGE NOTE - CHIEF COMPLAINT QUOTE
Pt c/o chest tightness  since yesterday and states similar episodes in the past with no diagnosis. PMH anxiety

## 2021-03-09 NOTE — ED CDU PROVIDER INITIAL DAY NOTE - FAMILY HISTORY
Father  Still living? Unknown  Family history of MI (myocardial infarction), Age at diagnosis: Age Unknown     Mother  Still living? Unknown  Family history of hyperglycemia, Age at diagnosis: Age Unknown

## 2021-03-09 NOTE — ED PROVIDER NOTE - RESPIRATORY [-], MLM
The following procedure was ordered per verbal order of Dr.Omar Dover.  Procedure: Total Knee Replacement-[id number]  Surgery scheduling requirements include:  Faciltiy: [facility]  no cough/no shortness of breath

## 2021-03-09 NOTE — ED CDU PROVIDER INITIAL DAY NOTE - PROGRESS NOTE DETAILS
Patient received at sign out. Pt admits to hx of anxiety and panic attacks. States had similar sx last night where he was anxious and shaky but notes he then had chest pain which was new. Trops neg x 2. Ecg stable. CXR clear. Pt transferred to CDU for stress test. Vitals stable. Heart/Lung sounds normal. Will continue to monitor closely.

## 2021-03-09 NOTE — ED PROVIDER NOTE - PROGRESS NOTE DETAILS
ella moore pgy2: pt reassessed, states burning pain still present but improved following medications, still with some chest tightness. discussed repeat troponin and possible cdu stay for stress test.

## 2021-03-09 NOTE — ED ADULT NURSE NOTE - OBJECTIVE STATEMENT
Patient alert and awake, oriented x4, breathing with ease on room air, complains of chest pressure 7/10 since this AM s/p eating cereal, reports coming here last week for the same episode but never followed up with primary car provider. Patient with skin intact, ambulates with steady gait, denies trouble breathing, fevers, chills, n/v/d, HI/SI/hallucinations, or sick contacts.

## 2021-03-09 NOTE — ED PROVIDER NOTE - OBJECTIVE STATEMENT
56yo M Hx of GERD, generalized anxiety disorder, migraines presenting with complaints of chest tightness. was seen last week for palpitations, anxiety, shakiness and fatigue. had pre-syncopal workup performed and discharged with instructions to follow up with his psychiatrist. spoke with his psychiatrist the following day and had his sertraline increased (on sertraline and xanax). states that since then he has had some improvement in his symptoms but began having central chest pressure over the past 3 days and last night began having burning in his chest. doesn't take medications for his GERD. no nausea, vomiting, bad taste in mouth, cough, abdominal pain. no sob, f/c, LE edema. had been admitted to [facility] in 2015 for chest pain and had cardiology eval but has not followed up with a cardiologist since.

## 2021-03-09 NOTE — ED PROVIDER NOTE - CLINICAL SUMMARY MEDICAL DECISION MAKING FREE TEXT BOX
56yo M Hx Migraines, MARII on xanax and sertraline, GERD presenting with complaints of chest tightness and burning. seen last week for palpitations and lightheadedness, discharged after negative pre-syncopal workup. sertraline increased by psychiatrist. mild improvement in symptoms but now with new chest tightness and burning. not on medications for GERD. may be related to GERD, anxiety, ACS. has not had cardiology w/u since 2015. will obtain labs, troponin, CXR, GI cocktail and covid swab, reassess. consider CDU for stress test.

## 2021-03-09 NOTE — ED CDU PROVIDER INITIAL DAY NOTE - PHYSICAL EXAMINATION
Vital signs reviewed.   CONSTITUTIONAL: Well-appearing; well-nourished; in no apparent distress. Non-toxic appearing.   HEAD: Normocephalic, atraumatic.  EYES: PERRL, EOM intact, conjunctiva and sclera WNL.  ENT: normal nose; no rhinorrhea; normal pharynx with no tonsillar swelling, no erythema, no exudate, no lymphadenopathy. no mucosal lesions   NECK/LYMPH: Supple; non-tender;   CARD: Regular Rate and Rhythm. Normal heart sounds  RESP: Normal chest excursion with respiration; breath sounds clear and equal bilaterally; no wheezes, rhonchi, or rales.  ABD/GI: soft, non-distended; non-tender   EXT/MS: moves all extremities;   SKIN: Normal for age and race; warm; no rashes noted.   NEURO: Awake, alert, oriented x 3  PSYCH: Normal mood; appropriate affect.

## 2021-03-09 NOTE — ED CDU PROVIDER INITIAL DAY NOTE - OBJECTIVE STATEMENT
56 y/o M pmh anxiety, GERD, migraines, BPH presented to the ED with c/o chest tightness x 12am last night. PT reports he was sleeping when the pain started. States he first felt coldness in his legs that ascending up to his body and he began having tremors and then the CP began. Nonradating, tightness, burning like. Improved since arrival. Reports occasional palpitations. Pt was seen last week for palpitations, anxiety, shakiness and fatigue. Pre-syncopal workup was performed and discharged with instructions to follow up with his psychiatrist-who increased his sertraline and xanax). Reports some improvement in his symptoms. Denies sob, abd pain, n/v/d, LE edema  In ED, EKG NSR non-ischemic, Troponin 7, 8; CXR shows clear lungs  Pt placed in CDU for stress test, tele, observation

## 2021-03-09 NOTE — ED CDU PROVIDER INITIAL DAY NOTE - CHIEF COMPLAINT
Partially impaired: cannot see medication labels or newsprint, but can see obstacles in path, and the surrounding layout; can count fingers at arm's length
The patient is a 55y Male complaining of chest pressure.

## 2021-03-10 PROCEDURE — 99226: CPT

## 2021-03-10 RX ADMIN — Medication 0.25 MILLIGRAM(S): at 12:29

## 2021-03-10 RX ADMIN — TAMSULOSIN HYDROCHLORIDE 0.4 MILLIGRAM(S): 0.4 CAPSULE ORAL at 21:48

## 2021-03-10 NOTE — ED CDU PROVIDER SUBSEQUENT DAY NOTE - PROGRESS NOTE DETAILS
Pt signed out to me by HERNESTO Stevens: 55yM w/pmhx reflux, anxiety, panic attacks presenting with 1-2 weeks of intermittent chest pain, shortness of breath, body shaking?, feeling faint. Was seen in ED 1 week ago with similar complaints, instructed to f/u with his psychiatrist and was started on zoloft. Symptoms persisted which is why he returned to the ED. Pt sent to CDU for stress test. Pt states he has no cp.  Feeling well.  Eating breakfast.  Awaiting stress results. Spoke with nuclear lab NP, pt needs to stay for rest images tomorrow Pt resting comfortable, no further complaints of chest pain, awaiting rest imaging in the morning

## 2021-03-11 VITALS
HEART RATE: 87 BPM | SYSTOLIC BLOOD PRESSURE: 114 MMHG | DIASTOLIC BLOOD PRESSURE: 74 MMHG | TEMPERATURE: 98 F | RESPIRATION RATE: 17 BRPM | OXYGEN SATURATION: 98 %

## 2021-03-11 PROCEDURE — 99217: CPT

## 2021-03-11 RX ADMIN — Medication 0.25 MILLIGRAM(S): at 12:48

## 2021-03-11 NOTE — ED CDU PROVIDER DISPOSITION NOTE - NSFOLLOWUPINSTRUCTIONS_ED_ALL_ED_FT
FOLLOW UP WITH CARDIOLOGIST with information provided to you.     Follow with your PRIMARY MEDICAL DOCTOR IN 2-3 DAYS OR WITHIN THE WEEK. If you have issues obtaining follow up, please call: [phone] to obtain a doctor or specialist who takes your insurance in your area.       SHOW COPIES OF YOUR RESULTS/REPORTS GIVEN TO YOU.        Take all of your other medications as previously prescribed. Worsening or continued chest pain, shortness of breath, weakness, return to ED.

## 2021-03-11 NOTE — ED CDU PROVIDER SUBSEQUENT DAY NOTE - PMH
Anxiety    CAD (coronary artery disease)    Herniated Disc    Herniated lumbar intervertebral disc    Hypercholesteremia    Migraine  2X weekly  
Anxiety    CAD (coronary artery disease)    Herniated Disc    Herniated lumbar intervertebral disc    Hypercholesteremia    Migraine  2X weekly

## 2021-03-11 NOTE — ED CDU PROVIDER SUBSEQUENT DAY NOTE - PHYSICAL EXAMINATION
Vital signs reviewed.   CONSTITUTIONAL: Well-appearing; well-nourished; in no apparent distress. Non-toxic appearing.   HEAD: Normocephalic, atraumatic.  EYES: PERRL, EOM intact, conjunctiva and sclera WNL.  CARD: Normal S1, S2; no murmurs, rubs, or gallops noted.  RESP: Normal chest excursion with respiration; breath sounds clear and equal bilaterally; no wheezes, rhonchi, or rales.  EXT/MS: moves all extremities;  no pedal edema.  SKIN: Normal for age and race;   NEURO: Awake, alert, oriented x 3,   PSYCH: Normal mood; appropriate affect.
Vital signs reviewed.   CONSTITUTIONAL: Well-appearing; well-nourished; in no apparent distress. Non-toxic appearing.   HEAD: Normocephalic, atraumatic.  EYES: PERRL, EOM intact, conjunctiva and sclera WNL.  CARD: Normal S1, S2; no murmurs, rubs, or gallops noted.  RESP: Normal chest excursion with respiration; breath sounds clear and equal bilaterally; no wheezes, rhonchi, or rales.  EXT/MS: moves all extremities;  no pedal edema.  SKIN: Normal for age and race;   NEURO: Awake, alert, oriented x 3,   PSYCH: Normal mood; appropriate affect.

## 2021-03-11 NOTE — CONSULT NOTE ADULT - ATTENDING COMMENTS
Patient seen and examined.  Agree with above.   Being seen for chest pain that has since resolved  TTE with normal LV function   NST with no ischemia  No further inpatient cardiac workup needed at this time.   Follow up with [facility] after discharge    Chevy Alvarez MD

## 2021-03-11 NOTE — ED CDU PROVIDER DISPOSITION NOTE - PATIENT PORTAL LINK FT
You can access the FollowMyHealth Patient Portal offered by [facility] by registering at the following website: [email or web address]. By joining [employer]’s FollowMyHealth portal, you will also be able to view your health information using other applications (apps) compatible with our system.

## 2021-03-11 NOTE — ED CDU PROVIDER SUBSEQUENT DAY NOTE - PROGRESS NOTE DETAILS
CDU HERNESTO Arita: Received signout on patient- seen and examined this morning with attending, Dr. Lux Patient rested comfortably overnight as per signout. This morning patient reports feeling nervous and shaky. Denying SOB/ cp. Pending Resting images today.

## 2021-03-11 NOTE — ED CDU PROVIDER SUBSEQUENT DAY NOTE - PSH
DDD (degenerative disc disease), lumbosacral    History of Testicular Biopsy  benign  
DDD (degenerative disc disease), lumbosacral    History of Testicular Biopsy  benign

## 2021-03-11 NOTE — ED CDU PROVIDER DISPOSITION NOTE - CLINICAL COURSE
55 y.o male with PMHx of Anxiety/panic attacks, GERD, migraines who presented to ED c/o chest pain. Pt seen and worked up in ED; Trop neg x 2. CXR normal, ecg non-ischemic. Pt transferred to CDU for stress, tele and vitals q4., while in CDU patient had stress test yesterday, but required resting images. Stress result normal study. Seen by Cards and given follow up information.

## 2021-03-11 NOTE — ED CDU PROVIDER DISPOSITION NOTE - ATTENDING CONTRIBUTION TO CARE
Pt was seen and evaluated by me. Pt is a 56 y/o male with PMHx of Anxiety, GERD, and migraines who presented to ED for chest pain. Pt states having chest discomfort and presented to the ED. Pt denies any fever, chills, nausea, vomiting, SOB, or abd pain. Pt was seen in the ED and sent to OBS for stress. Pt had stress and needed resting images. Lungs CTA b/l. RRR. Abd soft, non-tender. No LE swelling or calf tenderness.  Concern for ACS  Tele monitoring, resting images.

## 2021-03-11 NOTE — ED CDU PROVIDER SUBSEQUENT DAY NOTE - ATTENDING CONTRIBUTION TO CARE
CDU MD GONZALEZ:  I performed a face to face bedside interview with patient regarding history of present illness, review of symptoms and past medical history. I completed an independent physical exam.  I have discussed patient's plan of care with PA.   I agree with note as stated above, having amended the EMR as needed to reflect my findings. I have discussed the assessment and plan of care.  This includes during the time I functioned as the attending physician for this patient.
Pt was seen and evaluated by me. Pt is a 56 y/o male with PMHx of Anxiety, GERD, and migraines who presented to ED for chest pain. Pt states having chest discomfort and presented to the ED. Pt denies any fever, chills, nausea, vomiting, SOB, or abd pain. Pt was seen in the ED and sent to OBS for stress. Pt had stress and needed resting images. Lungs CTA b/l. RRR. Abd soft, non-tender. No LE swelling or calf tenderness.  Concern for ACS  Tele monitoring, resting images

## 2021-03-11 NOTE — ED CDU PROVIDER SUBSEQUENT DAY NOTE - HISTORY
Patient is a 55 y.o male with PMHx of Anxiety/panic attacks, GERD, migraines who presented to ED c/o chest pain. Pt seen and worked up in ED; Trop neg x 2. CXR normal, ecg non-ischemic. Pt transferred to CDU for stress, tele and vitals q4.     In interim- patient resting comfortably. Vitals stable. No acute events overnight. Pt pending stress in AM.
Patient is a 55 y.o male with PMHx of Anxiety/panic attacks, GERD, migraines who presented to ED c/o chest pain. Pt seen and worked up in ED; Trop neg x 2. CXR normal, ecg non-ischemic. Pt transferred to CDU for stress, tele and vitals q4.     In interim- while in CDU patient had stress test yesterday, but requires resting images. Patient currently resting comfortably. Vitals stable. No acute events overnight. Will continue with resting images this AM.

## 2021-03-11 NOTE — CONSULT NOTE ADULT - SUBJECTIVE AND OBJECTIVE BOX
HISTORY OF PRESENT ILLNESS: HPI:    54yo M Hx of GERD, generalized anxiety disorder, migraines presenting with complaints of ongoing intermittent chest tightness.  He presented last week with the same symptoms and followed up with his psychiatrist and had his meds increased.  He reports when he gets chest pain he then feels anxiety and shakes all over.    NO prior hx CAD, MI, CHF.  No recent cardiac work up.  Currently he completed his NST and is awaiting results.      PAST MEDICAL & SURGICAL HISTORY:  Herniated lumbar intervertebral disc    Anxiety    Hypercholesteremia    CAD (coronary artery disease)    Migraine  2X weekly    Herniated Disc    DDD (degenerative disc disease), lumbosacral    History of Testicular Biopsy  benign    MEDICATIONS  (STANDING):  ALPRAZolam 0.25 milliGRAM(s) Oral daily  tamsulosin 0.4 milliGRAM(s) Oral at bedtime      Allergies  aspirin (Unknown)    FAMILY HISTORY:  Family history of hyperglycemia (Mother)  Family history of MI (myocardial infarction) (Father)      SOCIAL HISTORY:    [x ] Non-smoker  [ ] Smoker  [ ] Alcohol    FLU VACCINE THIS YEAR STARTS IN AUGUST:  [ ] Yes    [ ] No    IF OVER 65 HAVE YOU EVER HAD A PNA VACCINE:  [ ] Yes    [ ] No       [ ] N/A      REVIEW OF SYSTEMS:  [ ]chest pain  [  ]shortness of breath  [  ]palpitations  [  ]syncope  [ ]near syncope [ ]upper extremity weakness   [ ] lower extremity weakness  [  ]diplopia  [  ]altered mental status   [  ]fevers  [ ]chills [ ]nausea  [ ]vomitting  [  ]dysphagia    [ ]abdominal pain  [ ]melena  [ ]BRBPR    [  ]epistaxis  [  ]rash    [ ]lower extremity edema        [x ] All others negative	  [ ] Unable to obtain      LABS:	 	    CARDIAC MARKERS:  Trop T 7, 8    Creatinine Trend: 0.96<--, 0.98<--        PHYSICAL EXAM:  T(C): 36.8 (03-11-21 @ 14:45), Max: 36.8 (03-10-21 @ 19:53)  HR: 87 (03-11-21 @ 14:45) (66 - 87)  BP: 114/74 (03-11-21 @ 14:45) (109/75 - 122/73)  RR: 17 (03-11-21 @ 14:45) (14 - 18)  SpO2: 98% (03-11-21 @ 14:45) (96% - 99%)      Gen: Appears well in NAD  HEENT:  (-)icterus (-)pallor  CV: N S1 S2 1/6 TESHA (+)2 Pulses B/l  Resp:  Clear to ausculatation B/L, normal effort  GI: (+) BS Soft, NT, ND  Lymph:  (-)Edema, (-)obvious lymphadenopathy  Skin: Warm to touch, Normal turgor  Psych: Appropriate mood and affect      ECG:  NSR	    < from: Transthoracic Echocardiogram (02.01.15 @ 07:06) >  Conclusions:  1. Minimal mitral regurgitation.  2.  Minimal aortic regurgitation.  3. Normal left ventricular internal dimensions and wall  thicknesses.  4. Normal left ventricular function.  5. Normal right ventricular size and function.  ------------------------------------------------------------------------  Confirmed on  2/2/2015 - 09:42:05 by Eron Hamilton MD  < end of copied text >    < from: Nuclear Stress Test-Exercise (03.10.21 @ 09:25) >  NUCLEAR FINDINGS:  Review of raw data shows: The study is of fair technical  quality with  adjacent gut tracer uptake.  The left ventricle was normal in size. Normal myocardial  perfusion scan,with no evidence of infarction or inducible  ischemia.  ------------------------------------------------------------------------  GATED ANALYSIS:  Post-stress gated wall motion analysis was performed (LVEF  = 50 %;LVEDV = 104ml.), revealing normal LV function.  There were no wall motion abnormalities. RV function  appeared probably normal (partially obstructed view)  ------------------------------------------------------------------------  IMPRESSIONS:Normal Study  * Myocardial Perfusion SPECT results are normal.  * Review of raw data shows: The study is of fair technical  quality with  adjacent gut tracer uptake.  * The left ventricle was normal in size. Normal myocardial  perfusion scan,with no evidence of infarction or inducible  ischemia.  * Post-stress gated wall motion analysis was performed  (LVEF = 50 %;LVEDV = 104 ml.), revealing normal LV  function. There were no wall motion abnormalities. RV  function appeared probably normal (partially obstructed  view)  ------------------------------------------------------------------------  Confirmed on  3/11/2021 - 11:58:48 by Regis Tang M.D.    < end of copied text >      ASSESSMENT/PLAN: 	54yo M Hx of GERD, generalized anxiety disorder, migraines presenting with complaints of ongoing intermittent chest tightness.       --ACS ruled out with serial CE  --NST with no ischemia or infarct  --no further inpatient work up planned  --F/U Dr Pickens 3/19 at [phone]

## 2021-03-11 NOTE — ED CDU PROVIDER SUBSEQUENT DAY NOTE - MEDICAL DECISION MAKING DETAILS
Patient is a 55 y.o male with PMHx of Anxiety/panic attacks, GERD, migraines who presented to ED c/o chest pain. Pt seen and worked up in ED; Trop neg x 2. CXR normal, ecg non-ischemic. Pt transferred to CDU for stress, tele and vitals q4.     In interim- patient resting comfortably. Vitals stable. No acute events overnight. Pt pending stress in AM.
Patient is a 55 y.o male with PMHx of Anxiety/panic attacks, GERD, migraines who presented to ED c/o chest pain. Pt seen and worked up in ED; Trop neg x 2. CXR normal, ecg non-ischemic. Pt transferred to CDU for stress, tele and vitals q4.     In interim- while in CDU patient had stress test yesterday, but requires resting images. Patient currently resting comfortably. Vitals stable. No acute events overnight. Will continue to with resting images this AM and will monitor closely.

## 2021-03-16 NOTE — ED PROVIDER NOTE - RESPIRATORY, MLM
Restorative Specialist Mobility Note       Activity: Ambulate in room, Bathroom privileges, Chair, Dangle, Stand at bedside(Educated/encouraged pt to ambulate with assistance 3-4 x's/day  Bed alarm on   Pt callbell, phone/tray within reach )     Assistive Device: Other (Comment)(Assist x1)       Ada HARRISON, Restorative Technician, [employer] Breath sounds clear and equal bilaterally.

## 2021-03-30 NOTE — ED PROVIDER NOTE - CPE EDP GASTRO NORM
Orthopedic note regarding etiology of intertrochanteric hip fracture  The patient's right comminuted intertrochanteric fracture occurred secondary to a fall.  As this was a ground-level fall, the fracture itself is pathologic in nature given the lack of significant trauma and force that was required to fracture the bone.  This fracture is secondary to the combination of existing osteoporosis and the minimal trauma that occurred   normal...

## 2021-04-08 NOTE — ED ADULT NURSE NOTE - DRUG PRE-SCREENING (DAST -1)
Atrial fibrillation- Paroxysmal in nature with known history.  Spontaneously converted to sinus rythm.  Bradycardic in sinus rythm.  Will hold metoprolol and continue verapamil she had been taking outpt.  Will continue Elliquis.  Likely the stress of COVID infection might have precipitated the episode.     HTN- continue verapamil. BP normal.    COVID infection- Management per primary team.   d/w primary team.     Other medical issues- Management per primary team.    Thank you for allowing me to participate in the care of this patient. Please feel free to contact me with any questions. 
Statement Selected

## 2021-05-14 NOTE — ED ADULT NURSE NOTE - ATTEMPT TO OOB
Patient Education     Sty (or Stye)  A sty is an infection of the oil gland of the eyelid. It may develop into a small pocket of pus (an abscess). This can cause pain, redness, and swelling. In early stages, a sty is treated with antibiotic cream, eye drops, or a small towel soaked in warm water (a warm compress). More severe cases may need to be opened and drained by a healthcare provider.  Home care  · Eye drops or ointment are usually prescribed to treat the infection. Use these as directed.   · Artificial tears may also be used to lubricate the eye and make it more comfortable. You can buy these over the counter without a prescription. Talk with your healthcare provider before using any over-the-counter treatment for a sty.  · Apply a warm, damp towel to the affected eye for at least 5 minutes, 3 to 4 times a day for a week. Warm compresses open the pores and speed the healing. But if the compresses are too hot, they may burn your eyelid.  · Sometimes the sty will drain with this treatment alone. If this happens, keep using the antibiotic until all the redness and swelling are gone.  · Wash your hands before and after touching the infected eyelid to avoid spreading the infection.  · Don’t squeeze or try to break open the sty.  Follow-up care  Follow up with your healthcare provider, or as advised.   When to seek medical advice  Call your healthcare provider right away if any of these occur:  · Increase in swelling or redness around the eyelid after 48 to 72 hours  · Increase in eye pain or the eyelid blisters  · Increase in warmth--the eyelid feels hot  · Drainage of blood or thick pus from the sty  · Blister on the eyelid  · Inability to open the eyelid due to swelling  · Fever of 100.4°F (38°C) or above, or as directed by your provider  · Vision changes  · Headache or stiff neck  · The sty comes back  Date Last Reviewed: 8/1/2017  © 3405-3962 [employer]. [address], [place]  [address]. All rights reserved. This information is not intended as a substitute for professional medical care. Always follow your healthcare professional's instructions.            no

## 2021-07-21 ENCOUNTER — APPOINTMENT (OUTPATIENT)
Dept: NEUROLOGY | Facility: CLINIC | Age: 56
End: 2021-07-21

## 2021-09-15 ENCOUNTER — EMERGENCY (EMERGENCY)
Facility: HOSPITAL | Age: 56
LOS: 1 days | Discharge: HOME | End: 2021-09-15
Admitting: EMERGENCY MEDICINE
Payer: MEDICARE

## 2021-09-15 VITALS
HEIGHT: 71 IN | HEART RATE: 82 BPM | RESPIRATION RATE: 16 BRPM | OXYGEN SATURATION: 100 % | TEMPERATURE: 98 F | SYSTOLIC BLOOD PRESSURE: 113 MMHG | DIASTOLIC BLOOD PRESSURE: 79 MMHG

## 2021-09-15 VITALS
HEART RATE: 69 BPM | DIASTOLIC BLOOD PRESSURE: 91 MMHG | OXYGEN SATURATION: 98 % | SYSTOLIC BLOOD PRESSURE: 125 MMHG | RESPIRATION RATE: 16 BRPM

## 2021-09-15 DIAGNOSIS — M51.37 OTHER INTERVERTEBRAL DISC DEGENERATION, LUMBOSACRAL REGION: Chronic | ICD-10-CM

## 2021-09-15 PROCEDURE — 99282 EMERGENCY DEPT VISIT SF MDM: CPT

## 2021-09-15 NOTE — ED PROVIDER NOTE - MUSCULOSKELETAL, MLM
left axilla: + tenderness and + adenopathy . no induration, fluctuance, erythema or acute signs of infection. Spine appears normal, range of motion is not limited, no muscle or joint tenderness

## 2021-09-15 NOTE — ED PROVIDER NOTE - NSFOLLOWUPINSTRUCTIONS_ED_ALL_ED_FT
Lymphadenopathy is swelling of your lymph nodes. Lymph nodes are small organs that are part of your immune system. Lymph nodes are found throughout your body. They are most easily felt in your neck, under your arms, and near your groin. Lymphadenopathy can occur in one or more areas of your body.     What causes lymphadenopathy? Lymphadenopathy is usually caused by a bacterial, viral, or fungal infection. Other causes include autoimmune diseases (such as rheumatoid arthritis or lupus), cancer, and sarcoidosis.    What are the signs and symptoms of lymphadenopathy? You may have no symptoms, or you may have any of the following:  •A painful, warm, or red lump under your skin    •More tired than usual    •Skin rash    •Unexplained weight loss    •Enlarged spleen (organ that filters blood)    •Fever or night sweats    How is lymphadenopathy diagnosed? Your healthcare provider will check your lymph node for its size and location. You may need the following tests to help healthcare providers find the cause of your lymphadenopathy:   •Blood tests may show if you have an infection or other medical condition.    •An x-ray, ultrasound, CT, or MRI of your lymph nodes make be taken. You may be given contrast liquid to help the lymph nodes show up better in the pictures. Tell the healthcare provider if you have ever had an allergic reaction to contrast liquid. Do not enter the MRI room with anything metal. Metal can cause serious injury. Tell the healthcare provider if you have any metal in or on your body.    •A lymph node biopsy is a procedure used to remove a sample of tissue to be tested. Healthcare providers may remove lymph cells through a needle or remove one or more lymph nodes during surgery.     How is lymphadenopathy treated? Your symptoms may go away without treatment. Your healthcare provider may need to treat the problem that has caused the lymph nodes to swell. Medicines may be given for infections, cancer, or other causes of your lymphadenopathy.    When should I seek immediate care?   •The swollen lymph nodes bleed.    •You have swollen lymph nodes in your neck that affect your breathing or swallowing.    When should I contact my healthcare provider?   •You have a fever.    •You have a new swollen and painful lymph node.    •You have a skin rash.    •Your lymph node remains swollen or painful, or it gets bigger.    •Your lymph node has red streaks around it, or the skin around the lymph node is red.    •You have questions or concerns about your condition or care.

## 2021-09-15 NOTE — ED PROVIDER NOTE - NSICDXFAMILYHX_GEN_ALL_CORE_FT
FAMILY HISTORY:  Father  Still living? Unknown  Family history of MI (myocardial infarction), Age at diagnosis: Age Unknown    Mother  Still living? Unknown  Family history of hyperglycemia, Age at diagnosis: Age Unknown

## 2021-09-15 NOTE — ED PROVIDER NOTE - NSICDXPASTSURGICALHX_GEN_ALL_CORE_FT
PAST SURGICAL HISTORY:  DDD (degenerative disc disease), lumbosacral     History of Testicular Biopsy benign

## 2021-09-15 NOTE — ED PROVIDER NOTE - PATIENT PORTAL LINK FT
You can access the FollowMyHealth Patient Portal offered by [facility] by registering at the following website: [email or web address]. By joining [email or web address]’s FollowMyHealth portal, you will also be able to view your health information using other applications (apps) compatible with our system.

## 2021-09-15 NOTE — ED PROVIDER NOTE - CLINICAL SUMMARY MEDICAL DECISION MAKING FREE TEXT BOX
patient presenting with swelling to left axillary region x 1 day. patient does report getting COVID vaccine to left shoulder couple days prior, symptoms likely due to reactive lymphadenopathy. patient advised on symptoms, advised on warm compress and NSAIDS.

## 2021-09-15 NOTE — ED PROVIDER NOTE - RESPIRATORY NEGATIVE STATEMENT, MLM
Addendum  created 01/25/21 1341 by Tyson Lundy MD    Order list changed
no chest pain, no cough, and no shortness of breath.

## 2021-09-15 NOTE — ED PROVIDER NOTE - OBJECTIVE STATEMENT
54 y/o M presenting with swelling and tenderness to left axilla region x few hours. He denies recent shaving, trauma, injuries prior to the onset of symptoms. he does endorse receiving COVID vaccine to the left deltoid a few days prior. he denies fever, chills, n/v, URI like symptoms. HA dizziness, neck pain

## 2021-09-15 NOTE — ED PROVIDER NOTE - NSICDXPASTMEDICALHX_GEN_ALL_CORE_FT
PAST MEDICAL HISTORY:  Anxiety     CAD (coronary artery disease)     Herniated Disc     Herniated lumbar intervertebral disc     Hypercholesteremia     Migraine 2X weekly

## 2021-09-15 NOTE — ED ADULT TRIAGE NOTE - CHIEF COMPLAINT QUOTE
pt c/o mass to left armpit starting today. Denies any other complaints. Denies drainage from site, fever, CP, SOB. Pt received first Moderna vaccine last week. Denies PMH

## 2021-11-02 NOTE — ED PROVIDER NOTE - PRINCIPAL DIAGNOSIS
Jose Daniel from [facility], stated that they do not take this patient's insurance.   
Put referral in for PT downstairs  
Chest pain

## 2022-02-08 NOTE — ED PROVIDER NOTE - LOCATION
Tisha is calling from [facility] regarding a prior authorization for Lucas Giles  [phone]   abdomen

## 2022-05-02 ENCOUNTER — EMERGENCY (EMERGENCY)
Facility: HOSPITAL | Age: 57
LOS: 1 days | Discharge: HOME | End: 2022-05-02
Attending: STUDENT IN AN ORGANIZED HEALTH CARE EDUCATION/TRAINING PROGRAM | Admitting: STUDENT IN AN ORGANIZED HEALTH CARE EDUCATION/TRAINING PROGRAM
Payer: MEDICARE

## 2022-05-02 VITALS
OXYGEN SATURATION: 100 % | SYSTOLIC BLOOD PRESSURE: 123 MMHG | RESPIRATION RATE: 16 BRPM | HEIGHT: 71 IN | DIASTOLIC BLOOD PRESSURE: 75 MMHG | TEMPERATURE: 98 F | HEART RATE: 70 BPM

## 2022-05-02 DIAGNOSIS — M51.37 OTHER INTERVERTEBRAL DISC DEGENERATION, LUMBOSACRAL REGION: Chronic | ICD-10-CM

## 2022-05-02 LAB
ALBUMIN SERPL ELPH-MCNC: 4.5 G/DL — SIGNIFICANT CHANGE UP (ref 3.3–5)
ALP SERPL-CCNC: 60 U/L — SIGNIFICANT CHANGE UP (ref 40–120)
ALT FLD-CCNC: 23 U/L — SIGNIFICANT CHANGE UP (ref 4–41)
ANION GAP SERPL CALC-SCNC: 9 MMOL/L — SIGNIFICANT CHANGE UP (ref 7–14)
AST SERPL-CCNC: 19 U/L — SIGNIFICANT CHANGE UP (ref 4–40)
BASOPHILS # BLD AUTO: 0.01 K/UL — SIGNIFICANT CHANGE UP (ref 0–0.2)
BASOPHILS NFR BLD AUTO: 0.2 % — SIGNIFICANT CHANGE UP (ref 0–2)
BILIRUB SERPL-MCNC: 0.5 MG/DL — SIGNIFICANT CHANGE UP (ref 0.2–1.2)
BUN SERPL-MCNC: 17 MG/DL — SIGNIFICANT CHANGE UP (ref 7–23)
CALCIUM SERPL-MCNC: 9.1 MG/DL — SIGNIFICANT CHANGE UP (ref 8.4–10.5)
CHLORIDE SERPL-SCNC: 104 MMOL/L — SIGNIFICANT CHANGE UP (ref 98–107)
CO2 SERPL-SCNC: 27 MMOL/L — SIGNIFICANT CHANGE UP (ref 22–31)
CREAT SERPL-MCNC: 0.9 MG/DL — SIGNIFICANT CHANGE UP (ref 0.5–1.3)
EGFR: 99 ML/MIN/1.73M2 — SIGNIFICANT CHANGE UP
EOSINOPHIL # BLD AUTO: 0.07 K/UL — SIGNIFICANT CHANGE UP (ref 0–0.5)
EOSINOPHIL NFR BLD AUTO: 1.2 % — SIGNIFICANT CHANGE UP (ref 0–6)
GLUCOSE SERPL-MCNC: 94 MG/DL — SIGNIFICANT CHANGE UP (ref 70–99)
HCT VFR BLD CALC: 42.5 % — SIGNIFICANT CHANGE UP (ref 39–50)
HGB BLD-MCNC: 14.3 G/DL — SIGNIFICANT CHANGE UP (ref 13–17)
IANC: 3.32 K/UL — SIGNIFICANT CHANGE UP (ref 1.8–7.4)
IMM GRANULOCYTES NFR BLD AUTO: 0.2 % — SIGNIFICANT CHANGE UP (ref 0–1.5)
LYMPHOCYTES # BLD AUTO: 1.86 K/UL — SIGNIFICANT CHANGE UP (ref 1–3.3)
LYMPHOCYTES # BLD AUTO: 31.7 % — SIGNIFICANT CHANGE UP (ref 13–44)
MAGNESIUM SERPL-MCNC: 2.4 MG/DL — SIGNIFICANT CHANGE UP (ref 1.6–2.6)
MCHC RBC-ENTMCNC: 32.6 PG — SIGNIFICANT CHANGE UP (ref 27–34)
MCHC RBC-ENTMCNC: 33.6 GM/DL — SIGNIFICANT CHANGE UP (ref 32–36)
MCV RBC AUTO: 96.8 FL — SIGNIFICANT CHANGE UP (ref 80–100)
MONOCYTES # BLD AUTO: 0.59 K/UL — SIGNIFICANT CHANGE UP (ref 0–0.9)
MONOCYTES NFR BLD AUTO: 10.1 % — SIGNIFICANT CHANGE UP (ref 2–14)
NEUTROPHILS # BLD AUTO: 3.32 K/UL — SIGNIFICANT CHANGE UP (ref 1.8–7.4)
NEUTROPHILS NFR BLD AUTO: 56.6 % — SIGNIFICANT CHANGE UP (ref 43–77)
NRBC # BLD: 0 /100 WBCS — SIGNIFICANT CHANGE UP
NRBC # FLD: 0 K/UL — SIGNIFICANT CHANGE UP
NT-PROBNP SERPL-SCNC: 24 PG/ML — SIGNIFICANT CHANGE UP
PLATELET # BLD AUTO: 195 K/UL — SIGNIFICANT CHANGE UP (ref 150–400)
POTASSIUM SERPL-MCNC: 4.2 MMOL/L — SIGNIFICANT CHANGE UP (ref 3.5–5.3)
POTASSIUM SERPL-SCNC: 4.2 MMOL/L — SIGNIFICANT CHANGE UP (ref 3.5–5.3)
PROT SERPL-MCNC: 7.5 G/DL — SIGNIFICANT CHANGE UP (ref 6–8.3)
RBC # BLD: 4.39 M/UL — SIGNIFICANT CHANGE UP (ref 4.2–5.8)
RBC # FLD: 12 % — SIGNIFICANT CHANGE UP (ref 10.3–14.5)
SODIUM SERPL-SCNC: 140 MMOL/L — SIGNIFICANT CHANGE UP (ref 135–145)
TROPONIN T, HIGH SENSITIVITY RESULT: 7 NG/L — SIGNIFICANT CHANGE UP
TROPONIN T, HIGH SENSITIVITY RESULT: 9 NG/L — SIGNIFICANT CHANGE UP
WBC # BLD: 5.86 K/UL — SIGNIFICANT CHANGE UP (ref 3.8–10.5)
WBC # FLD AUTO: 5.86 K/UL — SIGNIFICANT CHANGE UP (ref 3.8–10.5)

## 2022-05-02 PROCEDURE — 71046 X-RAY EXAM CHEST 2 VIEWS: CPT | Mod: 26

## 2022-05-02 PROCEDURE — 99285 EMERGENCY DEPT VISIT HI MDM: CPT | Mod: 25

## 2022-05-02 PROCEDURE — 93010 ELECTROCARDIOGRAM REPORT: CPT

## 2022-05-02 NOTE — ED PROVIDER NOTE - NSFOLLOWUPINSTRUCTIONS_ED_ALL_ED_FT
You were seen in the emergency department for blood pressure fluctuation.   Your lab results and imaging results are attached to this document.  Please follow up with cardiology and primary care doctor within 2 weeks.     SEEK IMMEDIATE MEDICAL CARE IF YOU HAVE ANY OF THE FOLLOWING SYMPTOMS: worsening chest pain, coughing up blood, unexplained back/neck/jaw pain, severe abdominal pain, dizziness or lightheadedness, fainting, shortness of breath, sweaty or clammy skin, vomiting, or racing heart beat. These symptoms may represent a serious problem that is an emergency. Do not wait to see if the symptoms will go away. Get medical help right away. Call 911 and do not drive yourself to the hospital.

## 2022-05-02 NOTE — ED ADULT TRIAGE NOTE - CHIEF COMPLAINT QUOTE
states" I was dizzy with elevated blood pressure on Saturday and I had a ahead ache yesterday " states he still feel like his pressure is high. denies any PMH

## 2022-05-02 NOTE — ED PROVIDER NOTE - CLINICAL SUMMARY MEDICAL DECISION MAKING FREE TEXT BOX
56M PMH generalized anxiety disorder, panic disorder presents with labile blood pressure, 1 episode of dizziness yesterday associated with chest pressure, palpitations and SOB. Last cardiac workup 1 year ago WNL. Given hx and physical will eval for ACS though low suspicion. Labs, ecg, cxr, reassess

## 2022-05-02 NOTE — ED PROVIDER NOTE - NSFOLLOWUPCLINICS_GEN_ALL_ED_FT
[facility]  Cardiology  [address] [place] [id number]  Phone: [phone]  Fax:     [facility]iology  [address] [facility], [address]  [place] [address]  Phone: [phone]  Fax:

## 2022-05-02 NOTE — ED PROVIDER NOTE - PATIENT PORTAL LINK FT
You can access the FollowMyHealth Patient Portal offered by [facility] by registering at the following website: [email or web address]. By joining [employer]’s FollowMyHealth portal, you will also be able to view your health information using other applications (apps) compatible with our system.

## 2022-05-02 NOTE — ED PROVIDER NOTE - OBJECTIVE STATEMENT
56M PMH generalized anxiety disorder, panic disorder presents with 1 episode of dizziness yesterday associated with chest pressure, palpitations and SOB. Pt states he noticed BP was elevated in 140s-150s and then was "low" in the 100's. Pt has no history of HTN, does not take HTN medication. Pt symptoms from yesterday have resolved, decided to come in today because PCP is not in the office today and he was worried about his blood pressure. Pt currently has no complaints except that he is concerned about BP. Denies chest pain, sob, fever, cough, chills, abdominal pain, n/v.

## 2022-05-02 NOTE — ED PROVIDER NOTE - ATTENDING CONTRIBUTION TO CARE
I have personally performed a face to face medical and diagnostic evaluation of the patient. I have discussed with and reviewed the Resident's note and agree with the History, ROS, Physical Exam and MDM unless otherwise indicated. A brief summary of my personal evaluation and impression can be found below.    56M hx of anxiety, panic disorder presents with a cc of dizziness yesterday a/w chest pressure, palpitations and SOB, noted BP was elevated to 140s-150s, and then went low,  came to ED for eval, does not take anything for HTN. Episode started while driving. No CP SOB or CERVANTES today. No dizziness. Denies numbness, tingling or loss of sensation. Denies loss of motor function. Denies n/v/f/c/sob. Denies syncope, dizziness. Denies chest palpitations, abdominal pain. Denies edema. Denies dysuria, hematuria, BRBPR, tarry stools, diarrhea, constipation.    All other ROS negative, except as above and as per HPI and ROS section.    VITALS: Initial triage and subsequent vitals have been reviewed by me.  GEN APPEARANCE: WDWN, alert, non-toxic, NAD  HEAD: Atraumatic.  EYES: PERRLa, EOMI, vision grossly intact.   NECK: Supple  CV: RRR, S1S2, no c/r/m/g. Cap refill <2 seconds. No bruits.   LUNGS: CTAB. No abnormal breath sounds.  ABDOMEN: Soft, NTND. No guarding or rebound.   MSK/EXT: No spinal or extremity point tenderness. No CVA ttp. Pelvis stable. No peripheral edema.  NEURO: Alert, follows commands. Weight bearing normal. Speech normal. Sensation and motor normal x4 extremities.   SKIN: Warm, dry and intact. No rash.  PSYCH: Appropriate    Plan/MDM: 56M hx of anxiety, panic disorder presents with a cc of dizziness yesterday a/w chest pressure, palpitations and SOB, noted BP was elevated to 140s-150s, and then went low,  came to ED for eval, does not take anything for HTN. No CP SOB or CERVANTES today. No dizziness. Denies numbness, tingling or loss of sensation. Denies loss of motor function exam vss non toxic PE as above ddx low c/f acs, BP not in range of htn emergency/urgency ekg reassuring will check labs cxr cardiacs meds as needed and reassess.

## 2022-05-02 NOTE — ED PROVIDER NOTE - PHYSICAL EXAMINATION
GENERAL: Awake. Alert. NAD. Well nourished.  HEENT: NC/AT, PERRL, EOMI, Conjunctiva pink, no scleral icterus. Airway patent. Moist mucous membranes.  LUNGS: CTAB. No wheezes or rales noted.  CARDIAC: Chest non-tender to palpation. RRR.   ABDOMEN: No masses noted.  Soft, NT, ND, no rebound, no guarding.  EXT: No edema, no calf tenderness, distal pulses 2+ bilaterally  NEURO: A&Ox3. Moving all extremities. Sensation and strength intact throughout. No focal deficits. Normal finger to nose. No pronator drift.  SKIN: Warm and dry.   PSYCH: Normal affect.

## 2022-05-02 NOTE — ED PROVIDER NOTE - PROGRESS NOTE DETAILS
Nancy Hook DO (PGY1): Pt resting comfortably in bed. Pending repeat trop and CXR read. Nancy Hook DO (PGY1): Trop neg x2. Pt made aware of lab and imaging results. Questions regarding their symptoms were addressed. Advised to follow up with cardiology and PCP . Given strict return precautions. Pt verbalized understanding. Maia ISSA: (Back Charting) Pt signed out to my colleague Dr. Stoddard at the usual time, pending repeat trop and assessment.

## 2022-06-19 ENCOUNTER — EMERGENCY (EMERGENCY)
Facility: HOSPITAL | Age: 57
LOS: 1 days | Discharge: HOME | End: 2022-06-19
Attending: EMERGENCY MEDICINE | Admitting: EMERGENCY MEDICINE
Payer: MEDICARE

## 2022-06-19 VITALS
TEMPERATURE: 97 F | HEIGHT: 71 IN | DIASTOLIC BLOOD PRESSURE: 83 MMHG | OXYGEN SATURATION: 100 % | HEART RATE: 95 BPM | RESPIRATION RATE: 16 BRPM | SYSTOLIC BLOOD PRESSURE: 128 MMHG

## 2022-06-19 DIAGNOSIS — M51.37 OTHER INTERVERTEBRAL DISC DEGENERATION, LUMBOSACRAL REGION: Chronic | ICD-10-CM

## 2022-06-19 LAB — SARS-COV-2 RNA SPEC QL NAA+PROBE: DETECTED

## 2022-06-19 PROCEDURE — 99284 EMERGENCY DEPT VISIT MOD MDM: CPT | Mod: 25

## 2022-06-19 PROCEDURE — 71046 X-RAY EXAM CHEST 2 VIEWS: CPT | Mod: 26

## 2022-06-19 PROCEDURE — 93010 ELECTROCARDIOGRAM REPORT: CPT

## 2022-06-19 RX ORDER — IBUPROFEN 200 MG
600 TABLET ORAL ONCE
Refills: 0 | Status: COMPLETED | OUTPATIENT
Start: 2022-06-19 | End: 2022-06-19

## 2022-06-19 RX ADMIN — Medication 600 MILLIGRAM(S): at 16:48

## 2022-06-19 RX ADMIN — Medication 100 MILLIGRAM(S): at 16:49

## 2022-06-19 NOTE — ED PROVIDER NOTE - CLINICAL SUMMARY MEDICAL DECISION MAKING FREE TEXT BOX
Ania Huynh DO PGY-1  56 year old male with pmh anxiety presents with 3 days of cough, sore throat, headache, chest pain with coughing. Wife with similar symptoms. Clinically nontoxic, afebrile, hemodynamically stable, satting 100% on room air, breathing comfortably. Lungs clear. EKG unchanged from prior. Had NST done 1 year ago which was unremarkable. Clinical picture non consistent with ACS, PE, or myocarditis. Will evaluate for covid, pneumonia, treat cough/headache, and re-assess.

## 2022-06-19 NOTE — ED PROVIDER NOTE - PHYSICAL EXAMINATION
PHYSICAL EXAM:  CONSTITUTIONAL: Well appearing, coughing, awake, alert, oriented to person, place, time/situation and in no apparent distress.  HEAD: Atraumatic  EYES: Clear bilaterally, pupils equal, round and reactive to light.  ENMT: Airway patent, Nasal mucosa clear. Mouth with normal mucosa. Mild oropharyngeal erythema. Uvula is midline. No peritonsillar abscesses. No exudates.   CARDIAC: Normal rate, regular rhythm.  +S1/S2.  No murmurs, rubs or gallops.  RESPIRATORY: Breathing unlabored. Breath sounds clear and equal bilaterally.  ABDOMEN:  Soft, nontender, nondistended. No rebound tenderness or guarding.  NEUROLOGICAL: Alert and oriented, no focal deficits, no motor or sensory deficits.   EXTREMITIES: No lower extremity edema B/L.   SKIN: Skin warm and dry. No evidence of rashes or lesions.

## 2022-06-19 NOTE — ED PROVIDER NOTE - NSFOLLOWUPINSTRUCTIONS_ED_ALL_ED_FT
You were seen in the emergency department for your cough.     The COVID test is still pending. You will receive the result in your email inbox.     Continue all regular home medications as prescribed.    For sore throat and aches take ibuprofen 600 mg every 6 hours. Additionally, you can take 1000 mg acetaminophen every 8 hours for your symptoms.   Take the benzonatate as prescribed: 100 mg up to 3 times a day for cough.    Follow up with your primary doctor within 3 days.    You were given copies of the labs and imaging results, please take them to your follow up appointments.    Return to the ER for any worsening symptoms or concerns including shortness of breath, weakness, chest pain, worsening or severe pain not controlled with medication, fever, or any other concerns.

## 2022-06-19 NOTE — ED PROVIDER NOTE - OBJECTIVE STATEMENT
56 year old male with PMH anxiety, panic attacks, presents with 3 days of cough, sore throat, headache, chest pain with coughing that is nonexertional. States wife had similar symptoms last week and he may have caught the same symptoms. Denies fever, chills, diaphoresis, shortness of breath, vomiting, diarrhea, abdominal pain. Tolerating PO without issue. Has history of migraines. Took 2 tylenol 3 hours ago for symptoms with minimal relief.     Contrary to triage note, pt states primary complaints are sore throat/cough and symptoms started 3 days ago.

## 2022-06-19 NOTE — ED PROVIDER NOTE - NS ED ROS FT
ROS:  -Constitutional: Denies fever  -Head: +headache  -Eyes: Denies blurry vision  -Cardiovascular: Denies palpitations  -Pulmonary: Denies shortness of breath  -Gastrointestinal: Denies nausea or diarrhea  -Genitourinary: Denies dysuria  -Skin: Denies new rashes  -Neuro: Denies numbness or tingling

## 2022-06-19 NOTE — ED PROVIDER NOTE - PROGRESS NOTE DETAILS
Ania Huynh DO PGY-1  Reviewed results, no signs of pneumonia on CXR. Will d/c home with oral benzonatate, supportive treatment and return precautions given.

## 2022-06-19 NOTE — ED PROVIDER NOTE - PATIENT PORTAL LINK FT
You can access the FollowMyHealth Patient Portal offered by [facility] by registering at the following website: [email or web address]. By joining [employer]’s FollowMyHealth portal, you will also be able to view your health information using other applications (apps) compatible with our system.

## 2022-06-19 NOTE — ED PROVIDER NOTE - ATTENDING CONTRIBUTION TO CARE
Brief HPI:  56 year old male with PMH anxiety, panic attacks, presents with 3 days of cough, sore throat, headache, chest pain with coughing. States wife is sick with uri-like illness.      Vitals:   Reviewed    Exam:    GEN:  Non-toxic appearing, non-distressed, speaking full sentences, non-diaphoretic, AAOx3  HEENT:  NCAT, neck supple, EOMI, PERRLA, sclera anicteric, no conjunctival pallor or injection, no stridor, normal voice, no tonsillar exudate, uvula midline  CV:  regular rhythm and rate, s1/s2 audible, no murmurs, rubs or gallops, peripheral pulses 2+ and symmetric  PULM:  non-labored respirations, lungs clear to auscultation bilaterally, no wheezes, crackles or rales  ABD:  non distended, non-tender, no rebound, no guarding, negative Rodriguez's sign, bowel sounds normal, no cvat  MSK:  no gross deformity, non-tender extremities and joints, range of motion grossly normal appearing, no extremity edema, extremities warm and well perfused   NEURO:  AAOx3, CN II-XII intact, motor 5/5 in upper and lower extremities bilaterally, sensation grossly intact in extremities and trunk, finger to nose testing wnl, no nystagmus, negative Romberg, no pronator drift, no gait deficit  SKIN:  warm, dry, no rash or vesicles     A/P:  56 year old male with PMH anxiety, panic attacks, presents with 3 days of cough, sore throat, headache, chest pain with coughing.  VSS.  Exam non-toxic appearing.  Lungs clear.  EKG non-ischemic.  Suspect uri, possible viral etiology.  Plan for cxr, supportive care.  Dispo pending.

## 2022-12-05 ENCOUNTER — EMERGENCY (EMERGENCY)
Facility: HOSPITAL | Age: 57
LOS: 1 days | Discharge: HOME | End: 2022-12-05
Attending: EMERGENCY MEDICINE | Admitting: EMERGENCY MEDICINE

## 2022-12-05 VITALS
SYSTOLIC BLOOD PRESSURE: 130 MMHG | RESPIRATION RATE: 16 BRPM | DIASTOLIC BLOOD PRESSURE: 81 MMHG | OXYGEN SATURATION: 99 % | HEART RATE: 98 BPM

## 2022-12-05 DIAGNOSIS — M51.37 OTHER INTERVERTEBRAL DISC DEGENERATION, LUMBOSACRAL REGION: Chronic | ICD-10-CM

## 2022-12-05 LAB
B PERT DNA SPEC QL NAA+PROBE: SIGNIFICANT CHANGE UP
B PERT+PARAPERT DNA PNL SPEC NAA+PROBE: SIGNIFICANT CHANGE UP
BORDETELLA PARAPERTUSSIS (RAPRVP): SIGNIFICANT CHANGE UP
C PNEUM DNA SPEC QL NAA+PROBE: SIGNIFICANT CHANGE UP
FLUAV H1 2009 PAND RNA SPEC QL NAA+PROBE: DETECTED
FLUBV RNA SPEC QL NAA+PROBE: SIGNIFICANT CHANGE UP
HADV DNA SPEC QL NAA+PROBE: SIGNIFICANT CHANGE UP
HCOV 229E RNA SPEC QL NAA+PROBE: SIGNIFICANT CHANGE UP
HCOV HKU1 RNA SPEC QL NAA+PROBE: SIGNIFICANT CHANGE UP
HCOV NL63 RNA SPEC QL NAA+PROBE: SIGNIFICANT CHANGE UP
HCOV OC43 RNA SPEC QL NAA+PROBE: SIGNIFICANT CHANGE UP
HMPV RNA SPEC QL NAA+PROBE: SIGNIFICANT CHANGE UP
HPIV1 RNA SPEC QL NAA+PROBE: SIGNIFICANT CHANGE UP
HPIV2 RNA SPEC QL NAA+PROBE: SIGNIFICANT CHANGE UP
HPIV3 RNA SPEC QL NAA+PROBE: SIGNIFICANT CHANGE UP
HPIV4 RNA SPEC QL NAA+PROBE: SIGNIFICANT CHANGE UP
M PNEUMO DNA SPEC QL NAA+PROBE: SIGNIFICANT CHANGE UP
RAPID RVP RESULT: DETECTED
RSV RNA SPEC QL NAA+PROBE: SIGNIFICANT CHANGE UP
RV+EV RNA SPEC QL NAA+PROBE: SIGNIFICANT CHANGE UP
SARS-COV-2 RNA SPEC QL NAA+PROBE: SIGNIFICANT CHANGE UP

## 2022-12-05 PROCEDURE — 71045 X-RAY EXAM CHEST 1 VIEW: CPT | Mod: 26

## 2022-12-05 PROCEDURE — 99284 EMERGENCY DEPT VISIT MOD MDM: CPT

## 2022-12-05 RX ORDER — ALBUTEROL 90 UG/1
2 AEROSOL, METERED ORAL
Qty: 1 | Refills: 0
Start: 2022-12-05 | End: 2022-12-11

## 2022-12-05 RX ORDER — IBUPROFEN 200 MG
600 TABLET ORAL ONCE
Refills: 0 | Status: COMPLETED | OUTPATIENT
Start: 2022-12-05 | End: 2022-12-05

## 2022-12-05 RX ORDER — DIPHENHYDRAMINE HCL 50 MG
50 CAPSULE ORAL ONCE
Refills: 0 | Status: COMPLETED | OUTPATIENT
Start: 2022-12-05 | End: 2022-12-05

## 2022-12-05 RX ORDER — IBUPROFEN 200 MG
30 TABLET ORAL
Qty: 630 | Refills: 0
Start: 2022-12-05 | End: 2022-12-11

## 2022-12-05 RX ORDER — ALBUTEROL 90 UG/1
2.5 AEROSOL, METERED ORAL ONCE
Refills: 0 | Status: COMPLETED | OUTPATIENT
Start: 2022-12-05 | End: 2022-12-05

## 2022-12-05 RX ORDER — DIPHENHYDRAMINE HCL 50 MG
2 CAPSULE ORAL
Qty: 10 | Refills: 0
Start: 2022-12-05 | End: 2022-12-09

## 2022-12-05 RX ADMIN — Medication 600 MILLIGRAM(S): at 11:49

## 2022-12-05 RX ADMIN — ALBUTEROL 2.5 MILLIGRAM(S): 90 AEROSOL, METERED ORAL at 11:50

## 2022-12-05 RX ADMIN — Medication 50 MILLIGRAM(S): at 11:50

## 2022-12-05 NOTE — ED PROVIDER NOTE - CLINICAL SUMMARY MEDICAL DECISION MAKING FREE TEXT BOX
The patient is a 57y Male who has a past medical and surgery history of CAD HLD Lumbar disc disease Anxiety PTED with 4 days of flu-like symptoms myalgias  sore throat, dry cough, fever/chills, head pain, body aches and chest tightness.   Vital Signs Last 24 Hrs  HR: 98 BP: 130/81 RR: 16 SpO2: 99% (05 Dec 2022 09:55) (99% - 99%)  PE: as described; my additions and exceptions are noted in the chart     IMPRESSION/RISK:  Dx= viral illness   Consideration include treat symptomatically whille obtaining CXR; Pt already ill x 4 days so not eligible for any antiviral therapy  Plan  as above  will d/c with same if cxr negative

## 2022-12-05 NOTE — ED PROVIDER NOTE - OBJECTIVE STATEMENT
The patient is a 57y Male who has a past medical and surgery history of CAD HLD Lumbar disc disease Anxiety PTED with 4 days of flu-like symptoms myalgias  sore throat, dry cough, fever/chills, head pain, body aches and chest tightness.

## 2022-12-05 NOTE — ED PROVIDER NOTE - CARE PROVIDER_API CALL
Marcelino Wall  Internal Medicine  [address] [place] [address]  Phone: [phone]  Fax: ([phone]  Established Patient  Follow Up Time: 4-6 Days

## 2022-12-05 NOTE — ED ADULT TRIAGE NOTE - CHIEF COMPLAINT QUOTE
c/o flu-like symptoms x 4 days. C/o sore throat, coughing, fever/chills, head pain, body aches and chest tightness. Denies any PMH

## 2022-12-05 NOTE — ED PROVIDER NOTE - PATIENT PORTAL LINK FT
You can access the FollowMyHealth Patient Portal offered by [facility] by registering at the following website: [email or web address]. By joining [employer]’s FollowMyHealth portal, you will also be able to view your health information using other applications (apps) compatible with our system.

## 2022-12-05 NOTE — ED PROVIDER NOTE - PROGRESS NOTE DETAILS
Patient d/chuy prior to RVP being + for influenza following review performed. As noted patient received flu vaccine last week     Q: Time period after receiving influenza vaccine that a nasal swab can give a false positive result?  A: Inactivated influenza vaccines, including Fluarix, are not known to cause false positive nasal swab tests. However, false positive test results are possible with rapid tests, and these are more likely to occur when influenza prevalence in the area is low. For more information regarding interpretation of rapid influenza tests, see www.cdc.gov/flu/professionals/diagnosis/rapidlab.htm.  Plan  Patient 4 days out from onset not a candidate for tamiflu continue current management RTED PRN

## 2022-12-05 NOTE — ED PROVIDER NOTE - NSFOLLOWUPINSTRUCTIONS_ED_ALL_ED_FT
[facility] General Internal Medicine  General Internal Medicine  [address] [place] [address]  Phone: [phone]  Fax:     [facility]  Internal Medicine  [address] [place] [address]  Phone: [phone]  Fax: [phone] Syndrome    WHAT YOU NEED TO KNOW:    Viral syndrome is a term used for symptoms of an infection caused by a virus. Viruses are spread easily from person to person through the air and on shared items.    DISCHARGE INSTRUCTIONS:    Call your local emergency number (911 in the ) or have someone else call if:    You have a seizure.    You cannot be woken.    You have chest pain or trouble breathing.  Seek care immediately if:    You have a stiff neck, a bad headache, and sensitivity to light.    You feel weak, dizzy, or confused.    You stop urinating or urinate a lot less than usual.    You cough up blood or thick yellow or green mucus.    You have severe abdominal pain or your abdomen is larger than usual.  Call your doctor if:    Your symptoms do not get better with treatment or get worse after 3 days.    You have a rash or ear pain.    You have burning when you urinate.    You have questions or concerns about your condition or care.  Medicines: You may need any of the following:    Acetaminophen decreases pain and fever. It is available without a doctor's order. Ask how much to take and how often to take it. Follow directions. Read the labels of all other medicines you are using to see if they also contain acetaminophen, or ask your doctor or pharmacist. Acetaminophen can cause liver damage if not taken correctly. Do not use more than 4 grams (4,000 milligrams) total of acetaminophen in one day.    NSAIDs, such as ibuprofen, help decrease swelling, pain, and fever. NSAIDs can cause stomach bleeding or kidney problems in certain people. If you take blood thinner medicine, always ask your healthcare provider if NSAIDs are safe for you. Always read the medicine label and follow directions.    Cold medicine helps decrease swelling, control a cough, and relieve chest or nasal congestion.    Saline nasal spray helps decrease nasal congestion.    Take your medicine as directed. Contact your healthcare provider if you think your medicine is not helping or if you have side effects. Tell him of her if you are allergic to any medicine. Keep a list of the medicines, vitamins, and herbs you take. Include the amounts, and when and why you take them. Bring the list or the pill bottles to follow-up visits. Carry your medicine list with you in case of an emergency.  Manage your symptoms:    Drink liquids as directed to prevent dehydration. Ask how much liquid to drink each day and which liquids are best for you. Ask if you should drink an oral rehydration solution (ORS). An ORS has the right amounts of water, salts, and sugar you need to replace body fluids. This may help prevent dehydration caused by vomiting or diarrhea. Do not drink liquids with caffeine. Liquids with caffeine can make dehydration worse.    Get plenty of rest to help your body heal. Take naps throughout the day. Ask your healthcare provider when you can return to work and your normal activities.    Use a cool mist humidifier to help you breathe easier. Ask your healthcare provider how to use a cool mist humidifier.    Eat honey or use cough drops for a sore throat. Cough drops are available without a doctor's order. Follow directions for taking cough drops.    Do not smoke or be close to anyone who is smoking. Nicotine and other chemicals in cigarettes and cigars can cause lung damage. Smoking can also delay healing. Ask your healthcare provider for information if you currently smoke and need help to quit. E-cigarettes or smokeless tobacco still contain nicotine. Talk to your healthcare provider before you use these products.  Prevent the spread of germs:      Wash your hands often. Wash your hands several times each day. Wash after you use the bathroom, change a child's diaper, and before you prepare or eat food. Use soap and water every time. Rub your soapy hands together, lacing your fingers. Wash the front and back of your hands, and in between your fingers. Use the fingers of one hand to scrub under the fingernails of the other hand. Wash for at least 20 seconds. Rinse with warm, running water for several seconds. Then dry your hands with a clean towel or paper towel. Use hand  that contains alcohol if soap and water are not available. Do not touch your eyes, nose, or mouth without washing your hands first.  Handwashing      Cover a sneeze or cough. Use a tissue that covers your mouth and nose. Throw the tissue away in a trash can right away. Use the bend of your arm if a tissue is not available. Wash your hands well with soap and water or use a hand .    Stay away from others while you are sick. Avoid crowds as much as possible.    Ask about vaccines you may need. Talk to your healthcare provider about your vaccine history. He or she will tell you which vaccines you need, and when to get them.  Get the influenza (flu) vaccine as soon as recommended each year. The flu vaccine is available starting in September or October. Flu viruses change, so it is important to get a flu vaccine every year.    Get the pneumonia vaccine if recommended. This vaccine is usually recommended every 5 years. Your provider will tell you when to get this vaccine, if needed.  Follow up with your doctor as directed: Write down your questions so you remember to ask them during your visits.    Síndrome viral    LO QUE NECESITA SABER:    El síndrome viral es un término usado para los síntomas de nevaeh infección causada por un virus. Los virus son propagados fácilmente de nevaeh persona a otra a través del aire y mediante los objetos que se comparten.    INSTRUCCIONES SOBRE EL JIMMY HOSPITALARIA:    Llame al número local de emergencias (911 en los [place]), o pídale a alguien que llame si:    Usted sufre nevaeh convulsión.    No es posible despertarlo.    Usted tiene dolor torácico y dificultad para respirar.  Busque atención médica de inmediato si:    Usted tiene rigidez en el arvind, dolor de zacarias intenso y sensibilidad a la sam.    Usted se siente débil, mareado o confundido.    Usted paola de orinar u orina menos de lo habitual.    Usted tose sarah o nevaeh mucosidad espesa amarilla o roque.    Usted tiene dolor abdominal severo o meade abdomen está más glenna de lo habitual.  Llame a meade médico si:    Alia síntomas no mejoran con el tratamiento o empeoran después de 3 días.    Usted tiene salpullido o dolor de oído.    Usted siente ardor al orinar.    Usted tiene preguntas o inquietudes acerca de meaed condición o cuidado.  Medicamentos:Es posible que usted necesite alguno de los siguientes:    Acetaminofénalivia el dolor y baja la fiebre. Está disponible sin receta médica. Pregunte la cantidad y la frecuencia con que debe tomarlos. Siga las indicaciones. Tyree las etiquetas de todos los demás medicamentos que esté usando para saber si también contienen acetaminofén, o pregunte a meade médico o farmacéutico. El acetaminofén puede causar daño en el hígado cuando no se gt de forma correcta. No use más de 4 gramos (4000 miligramos) en total de acetaminofeno en un día.    Los JAKUB,joe el ibuprofeno, ayudan a disminuir la inflamación, el dolor y la fiebre. Los JAKUB pueden causar sangrado estomacal o problemas renales en ciertas personas. Si usted gt un medicamento anticoagulante, siempre pregúntele a meade médico si los JAKUB son seguros para usted. Siempre tyree la etiqueta de samreen medicamento y siga las instrucciones.    El medicamento para el resfriadoayuda a disminuir la inflamación, a controlar la tos y a aliviar la congestión del pecho o nasal.    El rociador nasal de agua salinaayuda a disminuir la congestión nasal.    McGregor alia medicamentos joe se le haya indicado.Consulte con meade médico si usted oswaldo que meade medicamento no le está ayudando o si presenta efectos secundarios. Infórmele si es alérgico a algún medicamento. Mantenga nevaeh lista actualizada de los medicamentos, las vitaminas y los productos herbales que gt. Incluya los siguientes datos de los medicamentos: cantidad, frecuencia y motivo de administración. Traiga con usted la lista o los envases de las píldoras a alia citas de seguimiento. Lleve la lista de los medicamentos con usted en erasto de nevaeh emergencia.  El manejo de alia síntomas:    McGregor líquidos joe se le indique para evitar nevaeh deshidratación.Pregunte cuánto líquido debe carolina cada día y cuáles líquidos son los más adecuados para usted. Pregunte si usted debería carolina nevaeh solución de rehidratación oral (SRO). Nevaeh SRO tiene las cantidades exactas de agua, sal y azúcar que usted necesita para reemplazar los líquidos corporales. [place] podría ayudarlo a evitar la deshidratación a causa del vómito y de la diarrea. No tome líquidos con cafeína. Los líquidos con cafeína pueden empeorar la deshidratación.    Descanse lo suficiente para ayudar a que meade cuerpo sane.McGregor siestas hilda el día. Pregunte a meade médico cuándo puede regresar a meade trabajo y a alia actividades cotidianas.    Use un humidificador de danyelle fría para respirar más fácilmente.Pregunte a meade médico cómo usar un humidificador de vapor frío.    Coma miel o use pastillas para la tos para el dolor de garganta.Los caramelos para la tos están disponibles sin receta médica. Siga las indicaciones para carolina los caramelos para la tos.    No fume ni esté cerca a alguien que esté fumando.La nicotina y otras sustancias químicas que contienen los cigarrillos y cigarros pueden dañar los pulmones. El fumar también puede retrasar la sanación. Pida información a meade médico si usted actualmente fuma y necesita ayuda para dejar de fumar. Los cigarrillos electrónicos o el tabaco sin humo igualmente contienen nicotina. Consulte con meade médico antes de utilizar estos productos.  Prevenga la propagación de gérmenes:      Lávese las denise frecuentemente.Lávese las denise varias veces al día. Lávese después de usar el baño, después de cambiar pañales y antes de preparar la comida o comer. Use siempre agua y jabón. Frótese las denise enjabonadas, entrelazando los dedos. Lávese el frente y el dorso de las denise, y entre los dedos. Use los dedos de nevaeh mano para restregar debajo de las uñas de la otra mano. Lávese hilda al menos 20 segundos. Enjuague con agua corriente caliente hilda varios segundos. Luego séquese las denise con nevaeh toalla limpia o nevaeh toalla de papel. Puede usar un desinfectante para denise que contenga alcohol, si no hay agua y jabón disponibles. No se toque los ojos, la nariz o la boca sin antes lavarse las denise.  Lavado de denise      Cúbrase al toser o estornudar.Use un pañuelo que cubra la boca y la nariz. Arroje el pañuelo a la basura de inmediato. Use el ángulo del brazo si no tiene un pañuelo disponible. Lávese las denies con agua y jabón o use un desinfectante de denise.    Manténgase alejado de los demás mientras esté enfermo.Evite las multitudes lo más que pueda.    Pregunte sobre las vacunas que pudiera necesitar.Hable con meade médico sobre meade historial de vacunación. Meade médico le indicará qué vacunas necesita y cuándo recibirlas.  Vacúnese contra la influenza (gripe) tan pronto joe se recomiende cada año.La vacuna antigripal se ofrece a partir de septiembre u octubre. Los virus de la gripe cambian, por lo que es importante vacunarse contra la gripe cada año.    Vacúnese contra la neumonía si se recomienda.Esta vacuna generalmente se recomienda cada 5 años. Meade médico le indicará cuándo recibir esta vacuna, de ser necesaria.  Acuda a la consulta de control con meade médico según las indicaciones:Anote alia preguntas para que se acuerde de hacerlas hilda alia visitas.

## 2023-01-17 ENCOUNTER — APPOINTMENT (OUTPATIENT)
Dept: SPINE | Facility: CLINIC | Age: 58
End: 2023-01-17
Payer: MEDICARE

## 2023-01-17 VITALS
HEART RATE: 68 BPM | SYSTOLIC BLOOD PRESSURE: 129 MMHG | WEIGHT: 180 LBS | DIASTOLIC BLOOD PRESSURE: 77 MMHG | OXYGEN SATURATION: 95 % | HEIGHT: 71 IN | BODY MASS INDEX: 25.2 KG/M2

## 2023-01-17 DIAGNOSIS — Z87.438 PERSONAL HISTORY OF OTHER DISEASES OF MALE GENITAL ORGANS: ICD-10-CM

## 2023-01-17 DIAGNOSIS — M54.16 RADICULOPATHY, LUMBAR REGION: ICD-10-CM

## 2023-01-17 PROCEDURE — 99203 OFFICE O/P NEW LOW 30 MIN: CPT

## 2023-01-17 RX ORDER — PAROXETINE HYDROCHLORIDE 20 MG/1
20 TABLET, FILM COATED ORAL
Refills: 0 | Status: DISCONTINUED | COMMUNITY
End: 2023-01-17

## 2023-01-17 RX ORDER — CYCLOBENZAPRINE HYDROCHLORIDE 10 MG/1
10 TABLET, FILM COATED ORAL
Refills: 0 | Status: DISCONTINUED | COMMUNITY
End: 2023-01-17

## 2023-01-17 RX ORDER — OMEPRAZOLE 40 MG/1
40 CAPSULE, DELAYED RELEASE ORAL
Refills: 0 | Status: ACTIVE | COMMUNITY

## 2023-01-17 RX ORDER — SERTRALINE 25 MG/1
TABLET, FILM COATED ORAL
Refills: 0 | Status: ACTIVE | COMMUNITY

## 2023-01-17 RX ORDER — TAMSULOSIN HYDROCHLORIDE 0.4 MG/1
0.4 CAPSULE ORAL
Refills: 0 | Status: ACTIVE | COMMUNITY

## 2023-01-17 RX ORDER — PROCHLORPERAZINE MALEATE 5 MG/1
5 TABLET ORAL
Refills: 0 | Status: DISCONTINUED | COMMUNITY
End: 2023-01-17
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
## 2023-01-17 NOTE — REVIEW OF SYSTEMS
[Sleep Disturbances] : sleep disturbances [Anxiety] : anxiety [Decr. Concentrating Ability] : decreased concentrating ability [Poor Coordination] : poor coordination [Numbness] : numbness [Tingling] : tingling [Vertigo] : vertigo [Migraine Headache] : migraine headaches [Tension Headache] : tension-type headaches [Red Eyes] : red eyes [Dry Eyes] : dryness of the eyes [As Noted in HPI] : as noted in HPI [Abdominal Pain] : abdominal pain [Vomiting] : vomiting [Diarrhea] : diarrhea [Negative] : Heme/Lymph [FreeTextEntry3] : Checked off that he has Glaucoma. [FreeTextEntry7] : nausea

## 2023-01-17 NOTE — PHYSICAL EXAM
[General Appearance - Alert] : alert [General Appearance - In No Acute Distress] : in no acute distress [General Appearance - Well Nourished] : well nourished [General Appearance - Well Developed] : well developed [General Appearance - Well-Appearing] : healthy appearing [] : normal voice and communication [Person] : oriented to person [Place] : oriented to place [Time] : oriented to time [Short Term Intact] : short term memory intact [Remote Intact] : remote memory intact [Span Intact] : the attention span was normal [Concentration Intact] : normal concentrating ability [Fluency] : fluency intact [Comprehension] : comprehension intact [Current Events] : adequate knowledge of current events [Past History] : adequate knowledge of personal past history [Vocabulary] : adequate range of vocabulary [Cranial Nerves Optic (II)] : visual acuity intact bilaterally,  pupils equal round and reactive to light [Cranial Nerves Oculomotor (III)] : extraocular motion intact [Cranial Nerves Trigeminal (V)] : facial sensation intact symmetrically [Cranial Nerves Facial (VII)] : face symmetrical [Cranial Nerves Vestibulocochlear (VIII)] : hearing was intact bilaterally [Cranial Nerves Glossopharyngeal (IX)] : tongue and palate midline [Cranial Nerves Accessory (XI - Cranial And Spinal)] : head turning and shoulder shrug symmetric [Cranial Nerves Hypoglossal (XII)] : there was no tongue deviation with protrusion [Motor Tone] : muscle tone was normal in all four extremities [Motor Strength] : muscle strength was normal in all four extremities [No Muscle Atrophy] : normal bulk in all four extremities [4] : L3 quadriceps 4/5 [5] : S1 toe walking 5/5 [Sensation Tactile Decrease] : light touch was intact [Abnormal Walk] : normal gait [Balance] : balance was intact [Past-pointing] : there was no past-pointing [Tremor] : no tremor present [0] : Ankle jerk right 0 [2+] : Ankle jerk left 2+ [___] : absent on the right [___] : absent on the left [Moreland] : Moreland's sign was not demonstrated [FreeTextEntry1] : Had some difficulties with balance when heel and toe walking.  Able to step up and down from a stool with both legs without weakness but did have low back pain. [No Visual Abnormalities] : no visible abnormailities

## 2023-01-17 NOTE — ASSESSMENT
[FreeTextEntry1] : It was recommended that the patient start a course of physical therapy and he was provided with a referral.  He was advised to avoid bending, twisting and lifting. He is to have lumbar AP and lateral x rays with flexion and extension views to evaluate his bony anatomy and dynamic stability.  The patient is also to have an MRI of the lumbar spine without contrast to evaluate for any nerve impingement that would be causing his symptoms.  He is to return to the office with the images for Dr. Wilson to review.

## 2023-01-31 ENCOUNTER — OUTPATIENT (OUTPATIENT)
Dept: OUTPATIENT SERVICES | Facility: HOSPITAL | Age: 58
LOS: 1 days | End: 2023-01-31
Payer: COMMERCIAL

## 2023-01-31 ENCOUNTER — APPOINTMENT (OUTPATIENT)
Dept: MRI IMAGING | Facility: IMAGING CENTER | Age: 58
End: 2023-01-31
Payer: MEDICARE

## 2023-01-31 DIAGNOSIS — M51.37 OTHER INTERVERTEBRAL DISC DEGENERATION, LUMBOSACRAL REGION: Chronic | ICD-10-CM

## 2023-01-31 DIAGNOSIS — M54.16 RADICULOPATHY, LUMBAR REGION: ICD-10-CM

## 2023-01-31 PROCEDURE — 72148 MRI LUMBAR SPINE W/O DYE: CPT | Mod: 26

## 2023-01-31 PROCEDURE — 72148 MRI LUMBAR SPINE W/O DYE: CPT

## 2023-03-02 ENCOUNTER — NON-APPOINTMENT (OUTPATIENT)
Age: 58
End: 2023-03-02

## 2023-03-02 ENCOUNTER — APPOINTMENT (OUTPATIENT)
Dept: SPINE | Facility: CLINIC | Age: 58
End: 2023-03-02
Payer: MEDICARE

## 2023-03-02 VITALS
DIASTOLIC BLOOD PRESSURE: 74 MMHG | WEIGHT: 180 LBS | HEART RATE: 74 BPM | OXYGEN SATURATION: 96 % | SYSTOLIC BLOOD PRESSURE: 113 MMHG | BODY MASS INDEX: 25.2 KG/M2 | HEIGHT: 71 IN

## 2023-03-02 DIAGNOSIS — M54.50 LOW BACK PAIN, UNSPECIFIED: ICD-10-CM

## 2023-03-02 PROCEDURE — 99213 OFFICE O/P EST LOW 20 MIN: CPT
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
## 2023-08-11 ENCOUNTER — EMERGENCY (EMERGENCY)
Facility: HOSPITAL | Age: 58
LOS: 1 days | Discharge: HOME | End: 2023-08-11
Attending: EMERGENCY MEDICINE | Admitting: EMERGENCY MEDICINE
Payer: MEDICARE

## 2023-08-11 VITALS
RESPIRATION RATE: 17 BRPM | SYSTOLIC BLOOD PRESSURE: 113 MMHG | DIASTOLIC BLOOD PRESSURE: 74 MMHG | HEART RATE: 82 BPM | TEMPERATURE: 98 F | OXYGEN SATURATION: 100 %

## 2023-08-11 DIAGNOSIS — M51.37 OTHER INTERVERTEBRAL DISC DEGENERATION, LUMBOSACRAL REGION: Chronic | ICD-10-CM

## 2023-08-11 LAB
FLUAV AG NPH QL: SIGNIFICANT CHANGE UP
FLUBV AG NPH QL: SIGNIFICANT CHANGE UP
RSV RNA NPH QL NAA+NON-PROBE: SIGNIFICANT CHANGE UP
SARS-COV-2 RNA SPEC QL NAA+PROBE: DETECTED

## 2023-08-11 PROCEDURE — 71046 X-RAY EXAM CHEST 2 VIEWS: CPT | Mod: 26

## 2023-08-11 PROCEDURE — 93010 ELECTROCARDIOGRAM REPORT: CPT

## 2023-08-11 PROCEDURE — 99284 EMERGENCY DEPT VISIT MOD MDM: CPT

## 2023-08-11 RX ORDER — ACETAMINOPHEN 500 MG
975 TABLET ORAL ONCE
Refills: 0 | Status: COMPLETED | OUTPATIENT
Start: 2023-08-11 | End: 2023-08-11

## 2023-08-11 RX ADMIN — Medication 975 MILLIGRAM(S): at 14:12

## 2023-08-11 NOTE — ED PROVIDER NOTE - ATTENDING CONTRIBUTION TO CARE
I performed a face to face evaluation of this patient and performed a full history and physical examination on the patient.  I agree with the resident's history, physical examination, and plan of the patient.  This is a 58y/o gentleman with no significant PMHx presenting with 4 days of throat pain, cough, nasal congestion, headaches, and body aches, with a positive COVID test last night at a testing center, well appearing, lungs clear on auscultation, normal ECG, will check CXR, tx with tylenol and decongestants, follow up results, and reassess, likely discharge home with decongestants and return precautions.  Pt well appearing with clear lungs, heart s1,s2 rrr, abd soft nontender, neuro wnl

## 2023-08-11 NOTE — ED PROVIDER NOTE - PATIENT PORTAL LINK FT
You can access the FollowMyHealth Patient Portal offered by [facility] by registering at the following website: [email or web address]. By joining [facility]’s FollowMyHealth portal, you will also be able to view your health information using other applications (apps) compatible with our system.

## 2023-08-11 NOTE — ED ADULT NURSE NOTE - OBJECTIVE STATEMENT
received pt in intake room 3, 57 yr/o male A+OX4, ambulatory at baseline. pt states he has a positive home covid test. pt presented to the ED C/O worsening sore throat and cough. VSS, denies chest pain and SOB, RR even and unlabored. pt is congested and infrequent dry cough noted. pt covid swab sent. he is stable at this time.

## 2023-08-11 NOTE — ED ADULT TRIAGE NOTE - CHIEF COMPLAINT QUOTE
Presents to ED c/o cold-like symptoms x 4 days, pt had a covid test yesterday which came back positive.

## 2023-08-11 NOTE — ED ADULT NURSE NOTE - NSFALLUNIVINTERV_ED_ALL_ED
Bed/Stretcher in lowest position, wheels locked, appropriate side rails in place/Call bell, personal items and telephone in reach/Instruct patient to call for assistance before getting out of bed/chair/stretcher/Non-slip footwear applied when patient is off stretcher/[place] to call system/Physically safe environment - no spills, clutter or unnecessary equipment/Purposeful proactive rounding/Room/bathroom lighting operational, light cord in reach

## 2023-08-11 NOTE — ED PROVIDER NOTE - OBJECTIVE STATEMENT
58 y/o M pt with PMHx migraines, panic attacks, presenting into [facility] ED for evaluation of viral symptoms after a positive COVID test last night.  He reports 4 days ago throat pain, nasal congestion, productive cough, fevers, generalized headaches, body aches, nausea, and one episode of vomiting.  He also notes mild CP with coughing and some SOB.  He went to a testing center last night and tested positive for COVID-19.  No known sick contacts.  Last traveled home from [place] mid June.  Denies weakness, dizziness, vision changes, ear pain, weight loss, LOC, dysuria, hematuria, abdominal pain, diarrhea, constipation. 58 y/o M pt with PMHx migraines, panic attacks, presenting into [facility] ED for evaluation of viral symptoms after a positive COVID test last night.  He reports 4 days ago throat pain, nasal congestion, productive cough, fevers, generalized headaches, body aches, nausea, and one episode of vomiting.  He also notes mild CP with coughing and some SOB.  He went to a testing center last night and tested positive for COVID-19.  No known sick contacts.  Last traveled home from [place] mid June.  He has been eating and drinking normal at home.  Denies weakness, dizziness, vision changes, ear pain, weight loss, LOC, dysuria, hematuria, abdominal pain, diarrhea, constipation.

## 2023-08-11 NOTE — ED PROVIDER NOTE - NSFOLLOWUPINSTRUCTIONS_ED_ALL_ED_FT
You were seen in the emergency department for evaluation of throat pain, cough, nasal congestion, and headaches, we checked a chest x-ray, please see results below.  Please review these results with your primary care physician to establish any follow ups as required.  You can continue to take over the counter tylenol and motrin as needed for pain, please follow the instructions on the bottle.    Please RETURN TO THE EMERGENCY DEPARTMENT OR SEEK IMMEDIATE MEDICAL ATTENTION if you experience any new or worsening symptoms, including but not limited to: difficulty breathing, severe chest pain, chest pain at rest, fevers, weakness to arms or legs, fainting, vision changes, hearing loss. You were seen in the emergency department for evaluation of throat pain, cough, nasal congestion, and headaches, we checked a chest x-ray, please see results below.  Please review these results with your primary care physician to establish any follow ups as required.  You can continue to take over the counter tylenol and motrin as needed for pain, please follow the instructions on the bottle.    Please quarantine for 5 days following symptom onset AND until you are 24-48hrs fever free.    Please RETURN TO THE EMERGENCY DEPARTMENT OR SEEK IMMEDIATE MEDICAL ATTENTION if you experience any new or worsening symptoms, including but not limited to: difficulty breathing, severe chest pain, chest pain at rest, fevers, weakness to arms or legs, fainting, vision changes, hearing loss.

## 2023-08-11 NOTE — ED ADULT NURSE NOTE - NS_SISCREENINGSR_GEN_ALL_ED
JHK  DCCV w/UL  [employer] rep interrogation  Lianna Land   Clinical Specialist   [phone] Negative

## 2023-08-11 NOTE — ED PROVIDER NOTE - PHYSICAL EXAMINATION
Const: Awake, alert, no acute distress.  Well appearing.  Moving comfortably on stretcher.  HEENT: NC/AT.  Moist mucous membranes.  moderate pharyngeal erythema, no exudates.  No cervical lymphadenopathy.  Eyes: Extraocular movements intact b/l.  Pupils equal, round, and reactive to light b/l.  Conjunctiva pink.  No scleral icterus.  Neck: Neck supple, full ROM without pain.  Cardiac: Regular rate and regular rhythm. S1 S2 present.  Peripheral pulses 2+ and symmetric.  No LE edema.  No chest wall tenderness.  Resp: Speaking in full sentences. No evidence of respiratory distress.  Breath sounds clear to auscultation b/l.  Abd: Non-distended, soft, non-tender, no guarding, no rigidity, no rebound tenderness.  No palpable masses.  Back: Spine midline and non-tender. No CVAT.  Skin: Normal coloration.  No rashes, abrasions or lacerations.  Neuro: Awake, alert & oriented x 3.  Moves all extremities spontaneously.  No focal deficits.

## 2024-05-18 NOTE — ED PROVIDER NOTE - DATE/TIME 1
[facility]'s lab  
09-Mar-2021 13:10

## 2025-06-27 NOTE — ED PROVIDER NOTE - CLINICAL SUMMARY MEDICAL DECISION MAKING FREE TEXT BOX
Her/She This is a 58y/o gentleman with no significant PMHx presenting with 4 days of throat pain, cough, nasal congestion, headaches, and body aches, with a positive COVID test last night at a testing center, well appearing, lungs clear on auscultation, normal ECG, will check CXR, tx with tylenol and decongestants, follow up results, and reassess, likely discharge home with decongestants and return precautions.